# Patient Record
Sex: FEMALE | Race: BLACK OR AFRICAN AMERICAN | URBAN - METROPOLITAN AREA
[De-identification: names, ages, dates, MRNs, and addresses within clinical notes are randomized per-mention and may not be internally consistent; named-entity substitution may affect disease eponyms.]

---

## 2017-11-01 ENCOUNTER — EMERGENCY (EMERGENCY)
Facility: HOSPITAL | Age: 27
LOS: 1 days | Discharge: HOME | End: 2017-11-01
Attending: EMERGENCY MEDICINE
Payer: COMMERCIAL

## 2017-11-01 VITALS
SYSTOLIC BLOOD PRESSURE: 108 MMHG | OXYGEN SATURATION: 98 % | HEART RATE: 88 BPM | HEIGHT: 68 IN | WEIGHT: 169.98 LBS | TEMPERATURE: 98 F | RESPIRATION RATE: 18 BRPM | DIASTOLIC BLOOD PRESSURE: 64 MMHG

## 2017-11-01 DIAGNOSIS — Z98.89 OTHER SPECIFIED POSTPROCEDURAL STATES: Chronic | ICD-10-CM

## 2017-11-01 LAB
ALBUMIN SERPL ELPH-MCNC: 3.9 G/DL — SIGNIFICANT CHANGE UP (ref 3.5–5)
ALP SERPL-CCNC: 85 U/L — SIGNIFICANT CHANGE UP (ref 40–120)
ALT FLD-CCNC: 34 U/L DA — SIGNIFICANT CHANGE UP (ref 10–60)
ANION GAP SERPL CALC-SCNC: 11 MMOL/L — SIGNIFICANT CHANGE UP (ref 5–17)
APPEARANCE UR: CLEAR — SIGNIFICANT CHANGE UP
AST SERPL-CCNC: 32 U/L — SIGNIFICANT CHANGE UP (ref 10–40)
BASOPHILS # BLD AUTO: 0 K/UL — SIGNIFICANT CHANGE UP (ref 0–0.2)
BASOPHILS NFR BLD AUTO: 0.2 % — SIGNIFICANT CHANGE UP (ref 0–2)
BILIRUB SERPL-MCNC: 0.9 MG/DL — SIGNIFICANT CHANGE UP (ref 0.2–1.2)
BILIRUB UR-MCNC: NEGATIVE — SIGNIFICANT CHANGE UP
BUN SERPL-MCNC: 11 MG/DL — SIGNIFICANT CHANGE UP (ref 7–18)
CALCIUM SERPL-MCNC: 8.9 MG/DL — SIGNIFICANT CHANGE UP (ref 8.4–10.5)
CHLORIDE SERPL-SCNC: 105 MMOL/L — SIGNIFICANT CHANGE UP (ref 96–108)
CO2 SERPL-SCNC: 20 MMOL/L — LOW (ref 22–31)
COLOR SPEC: YELLOW — SIGNIFICANT CHANGE UP
CREAT SERPL-MCNC: 0.85 MG/DL — SIGNIFICANT CHANGE UP (ref 0.5–1.3)
DIFF PNL FLD: NEGATIVE — SIGNIFICANT CHANGE UP
EOSINOPHIL # BLD AUTO: 0 K/UL — SIGNIFICANT CHANGE UP (ref 0–0.5)
EOSINOPHIL NFR BLD AUTO: 0.2 % — SIGNIFICANT CHANGE UP (ref 0–6)
GLUCOSE SERPL-MCNC: 106 MG/DL — HIGH (ref 70–99)
GLUCOSE UR QL: NEGATIVE — SIGNIFICANT CHANGE UP
HCG SERPL-ACNC: <1 MIU/ML — SIGNIFICANT CHANGE UP
HCT VFR BLD CALC: 43.6 % — SIGNIFICANT CHANGE UP (ref 34.5–45)
HGB BLD-MCNC: 13.6 G/DL — SIGNIFICANT CHANGE UP (ref 11.5–15.5)
KETONES UR-MCNC: ABNORMAL
LEUKOCYTE ESTERASE UR-ACNC: ABNORMAL
LIDOCAIN IGE QN: 114 U/L — SIGNIFICANT CHANGE UP (ref 73–393)
LYMPHOCYTES # BLD AUTO: 0.4 K/UL — LOW (ref 1–3.3)
LYMPHOCYTES # BLD AUTO: 2.8 % — LOW (ref 13–44)
MCHC RBC-ENTMCNC: 30.5 PG — SIGNIFICANT CHANGE UP (ref 27–34)
MCHC RBC-ENTMCNC: 31.2 GM/DL — LOW (ref 32–36)
MCV RBC AUTO: 97.7 FL — SIGNIFICANT CHANGE UP (ref 80–100)
MONOCYTES # BLD AUTO: 0.5 K/UL — SIGNIFICANT CHANGE UP (ref 0–0.9)
MONOCYTES NFR BLD AUTO: 4 % — SIGNIFICANT CHANGE UP (ref 2–14)
NEUTROPHILS # BLD AUTO: 11.6 K/UL — HIGH (ref 1.8–7.4)
NEUTROPHILS NFR BLD AUTO: 92.8 % — HIGH (ref 43–77)
NITRITE UR-MCNC: NEGATIVE — SIGNIFICANT CHANGE UP
PH UR: 7 — SIGNIFICANT CHANGE UP (ref 5–8)
PLATELET # BLD AUTO: 136 K/UL — LOW (ref 150–400)
POTASSIUM SERPL-MCNC: 3.8 MMOL/L — SIGNIFICANT CHANGE UP (ref 3.5–5.3)
POTASSIUM SERPL-SCNC: 3.8 MMOL/L — SIGNIFICANT CHANGE UP (ref 3.5–5.3)
PROT SERPL-MCNC: 8.7 G/DL — HIGH (ref 6–8.3)
PROT UR-MCNC: NEGATIVE — SIGNIFICANT CHANGE UP
RBC # BLD: 4.46 M/UL — SIGNIFICANT CHANGE UP (ref 3.8–5.2)
RBC # FLD: 13.5 % — SIGNIFICANT CHANGE UP (ref 10.3–14.5)
SODIUM SERPL-SCNC: 136 MMOL/L — SIGNIFICANT CHANGE UP (ref 135–145)
SP GR SPEC: 1.01 — SIGNIFICANT CHANGE UP (ref 1.01–1.02)
UROBILINOGEN FLD QL: NEGATIVE — SIGNIFICANT CHANGE UP
WBC # BLD: 12.6 K/UL — HIGH (ref 3.8–10.5)
WBC # FLD AUTO: 12.6 K/UL — HIGH (ref 3.8–10.5)

## 2017-11-01 PROCEDURE — 85027 COMPLETE CBC AUTOMATED: CPT

## 2017-11-01 PROCEDURE — 99284 EMERGENCY DEPT VISIT MOD MDM: CPT | Mod: 25

## 2017-11-01 PROCEDURE — 81001 URINALYSIS AUTO W/SCOPE: CPT

## 2017-11-01 PROCEDURE — 96375 TX/PRO/DX INJ NEW DRUG ADDON: CPT

## 2017-11-01 PROCEDURE — 99285 EMERGENCY DEPT VISIT HI MDM: CPT

## 2017-11-01 PROCEDURE — 96376 TX/PRO/DX INJ SAME DRUG ADON: CPT

## 2017-11-01 PROCEDURE — 80053 COMPREHEN METABOLIC PANEL: CPT

## 2017-11-01 PROCEDURE — 84702 CHORIONIC GONADOTROPIN TEST: CPT

## 2017-11-01 PROCEDURE — 96374 THER/PROPH/DIAG INJ IV PUSH: CPT

## 2017-11-01 PROCEDURE — 83690 ASSAY OF LIPASE: CPT

## 2017-11-01 PROCEDURE — 82962 GLUCOSE BLOOD TEST: CPT

## 2017-11-01 RX ORDER — SODIUM CHLORIDE 9 MG/ML
1000 INJECTION INTRAMUSCULAR; INTRAVENOUS; SUBCUTANEOUS ONCE
Qty: 0 | Refills: 0 | Status: COMPLETED | OUTPATIENT
Start: 2017-11-01 | End: 2017-11-01

## 2017-11-01 RX ORDER — METOCLOPRAMIDE HCL 10 MG
10 TABLET ORAL ONCE
Qty: 0 | Refills: 0 | Status: COMPLETED | OUTPATIENT
Start: 2017-11-01 | End: 2017-11-01

## 2017-11-01 RX ORDER — ONDANSETRON 8 MG/1
4 TABLET, FILM COATED ORAL ONCE
Qty: 0 | Refills: 0 | Status: COMPLETED | OUTPATIENT
Start: 2017-11-01 | End: 2017-11-01

## 2017-11-01 RX ORDER — FAMOTIDINE 10 MG/ML
20 INJECTION INTRAVENOUS ONCE
Qty: 0 | Refills: 0 | Status: COMPLETED | OUTPATIENT
Start: 2017-11-01 | End: 2017-11-01

## 2017-11-01 RX ORDER — MORPHINE SULFATE 50 MG/1
4 CAPSULE, EXTENDED RELEASE ORAL ONCE
Qty: 0 | Refills: 0 | Status: DISCONTINUED | OUTPATIENT
Start: 2017-11-01 | End: 2017-11-01

## 2017-11-01 RX ORDER — ACETAMINOPHEN 500 MG
975 TABLET ORAL ONCE
Qty: 0 | Refills: 0 | Status: COMPLETED | OUTPATIENT
Start: 2017-11-01 | End: 2017-11-01

## 2017-11-01 RX ADMIN — Medication 30 MILLILITER(S): at 15:15

## 2017-11-01 RX ADMIN — Medication 10 MILLIGRAM(S): at 23:52

## 2017-11-01 RX ADMIN — Medication 975 MILLIGRAM(S): at 21:15

## 2017-11-01 RX ADMIN — FAMOTIDINE 20 MILLIGRAM(S): 10 INJECTION INTRAVENOUS at 15:15

## 2017-11-01 RX ADMIN — ONDANSETRON 4 MILLIGRAM(S): 8 TABLET, FILM COATED ORAL at 22:30

## 2017-11-01 RX ADMIN — ONDANSETRON 4 MILLIGRAM(S): 8 TABLET, FILM COATED ORAL at 15:16

## 2017-11-01 RX ADMIN — SODIUM CHLORIDE 1000 MILLILITER(S): 9 INJECTION INTRAMUSCULAR; INTRAVENOUS; SUBCUTANEOUS at 14:33

## 2017-11-01 RX ADMIN — SODIUM CHLORIDE 1000 MILLILITER(S): 9 INJECTION INTRAMUSCULAR; INTRAVENOUS; SUBCUTANEOUS at 19:28

## 2017-11-01 RX ADMIN — SODIUM CHLORIDE 1000 MILLILITER(S): 9 INJECTION INTRAMUSCULAR; INTRAVENOUS; SUBCUTANEOUS at 22:30

## 2017-11-01 NOTE — ED PROVIDER NOTE - MEDICAL DECISION MAKING DETAILS
28 y/o F presenting w/ multiple episodes of vomiting and abd pain. Will give GI cocktail, IVF, check labs and re-assess. 28 y/o F presenting w/ multiple episodes of vomiting and abd pain. Will give GI cocktail, IVF, check labs and re-assess.  patient feeling better. Tolerated PO, abdomen nontender. home with symptomatic care.

## 2017-11-01 NOTE — ED PROVIDER NOTE - OBJECTIVE STATEMENT
28 y/o F w/ PMHx of GI bleed s/p laparotomy presents to ED c/o many episodes of yellow vomiting and upper abd pain. Pt also notes having weakness. Denies any fever, diarrhea, or any other complaints. NKDA.

## 2017-11-01 NOTE — ED ADULT NURSE NOTE - OBJECTIVE STATEMENT
pt a&ox3, here with c/o vomiting. pt is employee of hospital. pt with n/v which started today. denies abdominal pain.

## 2017-11-01 NOTE — ED ADULT NURSE NOTE - ED STAT RN HANDOFF DETAILS
endorsed to RN Emilio in B2 in stable condition for continuation of care. pt a&ox3, here for vomiting. 18G left hand. due for CT scan. endorsed to RN VALENTINE in B2 in stable condition for continuation of care. pt a&ox3, here for vomiting. 18G left hand. pending MD dispo.

## 2017-11-01 NOTE — ED PROVIDER NOTE - PROGRESS NOTE DETAILS
patient now started having diarrhea, multiple episodes while in Emergency Department. abdomen nontender on multiple reassessments patient vomited again. will give IV fluids zofran and reassess

## 2017-11-02 VITALS
RESPIRATION RATE: 16 BRPM | HEART RATE: 95 BPM | OXYGEN SATURATION: 100 % | DIASTOLIC BLOOD PRESSURE: 57 MMHG | SYSTOLIC BLOOD PRESSURE: 108 MMHG | TEMPERATURE: 98 F

## 2017-12-27 NOTE — ED ADULT NURSE NOTE - CHPI ED SYMPTOMS POS
Ashley from Crisis states she is going to place patient on a chapter 51. MICHELLE called.    VOMITING/NAUSEA

## 2018-05-23 ENCOUNTER — EMERGENCY (EMERGENCY)
Facility: HOSPITAL | Age: 28
LOS: 1 days | Discharge: HOME | End: 2018-05-23
Attending: EMERGENCY MEDICINE
Payer: COMMERCIAL

## 2018-05-23 VITALS
OXYGEN SATURATION: 100 % | SYSTOLIC BLOOD PRESSURE: 110 MMHG | HEIGHT: 68 IN | DIASTOLIC BLOOD PRESSURE: 76 MMHG | RESPIRATION RATE: 16 BRPM | WEIGHT: 169.98 LBS | TEMPERATURE: 98 F | HEART RATE: 80 BPM

## 2018-05-23 DIAGNOSIS — Z98.89 OTHER SPECIFIED POSTPROCEDURAL STATES: Chronic | ICD-10-CM

## 2018-05-23 PROCEDURE — 99282 EMERGENCY DEPT VISIT SF MDM: CPT

## 2018-05-23 PROCEDURE — 99283 EMERGENCY DEPT VISIT LOW MDM: CPT

## 2018-05-23 RX ORDER — OFLOXACIN 0.3 %
1 DROPS OPHTHALMIC (EYE)
Qty: 1 | Refills: 0 | OUTPATIENT
Start: 2018-05-23 | End: 2018-05-27

## 2018-05-23 NOTE — ED PROVIDER NOTE - OBJECTIVE STATEMENT
pink eye Patient reports redness to right eye for 1 day with discharge and crusting of eyelashes. No fever, vision loss, ha, nasal congestion.

## 2018-05-23 NOTE — ED PROVIDER NOTE - MEDICAL DECISION MAKING DETAILS
slit lamp, antibiotic eye drops and work note   instructions on care and contangious slit lamp, antibiotic eye drops and work note   instructions on care and contagion. Return to the ED immediately if getting worse, not improving, or if having any new or troubling symptoms.

## 2018-09-25 ENCOUNTER — EMERGENCY (EMERGENCY)
Facility: HOSPITAL | Age: 28
LOS: 1 days | Discharge: HOME | End: 2018-09-25
Attending: EMERGENCY MEDICINE
Payer: COMMERCIAL

## 2018-09-25 VITALS
HEART RATE: 73 BPM | RESPIRATION RATE: 16 BRPM | OXYGEN SATURATION: 100 % | SYSTOLIC BLOOD PRESSURE: 117 MMHG | TEMPERATURE: 98 F | DIASTOLIC BLOOD PRESSURE: 69 MMHG

## 2018-09-25 DIAGNOSIS — Z98.89 OTHER SPECIFIED POSTPROCEDURAL STATES: Chronic | ICD-10-CM

## 2018-09-25 PROCEDURE — 73620 X-RAY EXAM OF FOOT: CPT | Mod: 26,LT

## 2018-09-25 PROCEDURE — 99283 EMERGENCY DEPT VISIT LOW MDM: CPT | Mod: 25

## 2018-09-25 PROCEDURE — 99283 EMERGENCY DEPT VISIT LOW MDM: CPT

## 2018-09-25 PROCEDURE — 73620 X-RAY EXAM OF FOOT: CPT

## 2018-09-25 NOTE — ED PROVIDER NOTE - OBJECTIVE STATEMENT
29 y/o F patient with no significant PMHx and a significant PSHx of vascular surgery presents to the ED with left foot pain. Patient states her foot was caught between two x2 objects. Patient states she began to feel left foot pain shortly after. Patient denies any other complaints. NKDA.

## 2018-09-25 NOTE — ED ADULT NURSE NOTE - NSIMPLEMENTINTERV_GEN_ALL_ED
Implemented All Universal Safety Interventions:  [place] to call system. Call bell, personal items and telephone within reach. Instruct patient to call for assistance. Room bathroom lighting operational. Non-slip footwear when patient is off stretcher. Physically safe environment: no spills, clutter or unnecessary equipment. Stretcher in lowest position, wheels locked, appropriate side rails in place.

## 2018-12-04 ENCOUNTER — INPATIENT (INPATIENT)
Facility: HOSPITAL | Age: 28
LOS: 1 days | Discharge: HOME | DRG: 745 | End: 2018-12-06
Attending: OBSTETRICS & GYNECOLOGY | Admitting: OBSTETRICS & GYNECOLOGY
Payer: COMMERCIAL

## 2018-12-04 ENCOUNTER — TRANSCRIPTION ENCOUNTER (OUTPATIENT)
Age: 28
End: 2018-12-04

## 2018-12-04 VITALS
TEMPERATURE: 98 F | RESPIRATION RATE: 18 BRPM | WEIGHT: 177.91 LBS | HEART RATE: 68 BPM | HEIGHT: 68 IN | DIASTOLIC BLOOD PRESSURE: 70 MMHG | OXYGEN SATURATION: 100 % | SYSTOLIC BLOOD PRESSURE: 118 MMHG

## 2018-12-04 DIAGNOSIS — D25.9 LEIOMYOMA OF UTERUS, UNSPECIFIED: ICD-10-CM

## 2018-12-04 DIAGNOSIS — Z98.89 OTHER SPECIFIED POSTPROCEDURAL STATES: Chronic | ICD-10-CM

## 2018-12-04 DIAGNOSIS — N93.9 ABNORMAL UTERINE AND VAGINAL BLEEDING, UNSPECIFIED: ICD-10-CM

## 2018-12-04 LAB
ALBUMIN SERPL ELPH-MCNC: 3.7 G/DL — SIGNIFICANT CHANGE UP (ref 3.5–5)
ALP SERPL-CCNC: 68 U/L — SIGNIFICANT CHANGE UP (ref 40–120)
ALT FLD-CCNC: 31 U/L DA — SIGNIFICANT CHANGE UP (ref 10–60)
ANION GAP SERPL CALC-SCNC: 9 MMOL/L — SIGNIFICANT CHANGE UP (ref 5–17)
APTT BLD: 29.7 SEC — SIGNIFICANT CHANGE UP (ref 27.5–36.3)
AST SERPL-CCNC: 20 U/L — SIGNIFICANT CHANGE UP (ref 10–40)
BASOPHILS # BLD AUTO: 0.1 K/UL — SIGNIFICANT CHANGE UP (ref 0–0.2)
BASOPHILS NFR BLD AUTO: 1.1 % — SIGNIFICANT CHANGE UP (ref 0–2)
BILIRUB SERPL-MCNC: 0.3 MG/DL — SIGNIFICANT CHANGE UP (ref 0.2–1.2)
BUN SERPL-MCNC: 12 MG/DL — SIGNIFICANT CHANGE UP (ref 7–18)
CALCIUM SERPL-MCNC: 8.4 MG/DL — SIGNIFICANT CHANGE UP (ref 8.4–10.5)
CHLORIDE SERPL-SCNC: 107 MMOL/L — SIGNIFICANT CHANGE UP (ref 96–108)
CO2 SERPL-SCNC: 24 MMOL/L — SIGNIFICANT CHANGE UP (ref 22–31)
CREAT SERPL-MCNC: 0.82 MG/DL — SIGNIFICANT CHANGE UP (ref 0.5–1.3)
EOSINOPHIL # BLD AUTO: 0.2 K/UL — SIGNIFICANT CHANGE UP (ref 0–0.5)
EOSINOPHIL NFR BLD AUTO: 4 % — SIGNIFICANT CHANGE UP (ref 0–6)
GLUCOSE SERPL-MCNC: 90 MG/DL — SIGNIFICANT CHANGE UP (ref 70–99)
HCG SERPL-ACNC: <1 MIU/ML — SIGNIFICANT CHANGE UP
HCT VFR BLD CALC: 35.9 % — SIGNIFICANT CHANGE UP (ref 34.5–45)
HCT VFR BLD CALC: 38.2 % — SIGNIFICANT CHANGE UP (ref 34.5–45)
HCT VFR BLD CALC: 40 % — SIGNIFICANT CHANGE UP (ref 34.5–45)
HGB BLD-MCNC: 11.1 G/DL — LOW (ref 11.5–15.5)
HGB BLD-MCNC: 12 G/DL — SIGNIFICANT CHANGE UP (ref 11.5–15.5)
HGB BLD-MCNC: 12.7 G/DL — SIGNIFICANT CHANGE UP (ref 11.5–15.5)
INR BLD: 1.06 RATIO — SIGNIFICANT CHANGE UP (ref 0.88–1.16)
LYMPHOCYTES # BLD AUTO: 2.3 K/UL — SIGNIFICANT CHANGE UP (ref 1–3.3)
LYMPHOCYTES # BLD AUTO: 43.7 % — SIGNIFICANT CHANGE UP (ref 13–44)
MCHC RBC-ENTMCNC: 29.9 PG — SIGNIFICANT CHANGE UP (ref 27–34)
MCHC RBC-ENTMCNC: 30 PG — SIGNIFICANT CHANGE UP (ref 27–34)
MCHC RBC-ENTMCNC: 30.4 PG — SIGNIFICANT CHANGE UP (ref 27–34)
MCHC RBC-ENTMCNC: 31 GM/DL — LOW (ref 32–36)
MCHC RBC-ENTMCNC: 31.4 GM/DL — LOW (ref 32–36)
MCHC RBC-ENTMCNC: 31.9 GM/DL — LOW (ref 32–36)
MCV RBC AUTO: 94 FL — SIGNIFICANT CHANGE UP (ref 80–100)
MCV RBC AUTO: 95.6 FL — SIGNIFICANT CHANGE UP (ref 80–100)
MCV RBC AUTO: 97.9 FL — SIGNIFICANT CHANGE UP (ref 80–100)
MONOCYTES # BLD AUTO: 0.4 K/UL — SIGNIFICANT CHANGE UP (ref 0–0.9)
MONOCYTES NFR BLD AUTO: 7.9 % — SIGNIFICANT CHANGE UP (ref 2–14)
NEUTROPHILS # BLD AUTO: 2.3 K/UL — SIGNIFICANT CHANGE UP (ref 1.8–7.4)
NEUTROPHILS NFR BLD AUTO: 43.3 % — SIGNIFICANT CHANGE UP (ref 43–77)
PLATELET # BLD AUTO: 176 K/UL — SIGNIFICANT CHANGE UP (ref 150–400)
PLATELET # BLD AUTO: 195 K/UL — SIGNIFICANT CHANGE UP (ref 150–400)
PLATELET # BLD AUTO: 199 K/UL — SIGNIFICANT CHANGE UP (ref 150–400)
POTASSIUM SERPL-MCNC: 3.8 MMOL/L — SIGNIFICANT CHANGE UP (ref 3.5–5.3)
POTASSIUM SERPL-SCNC: 3.8 MMOL/L — SIGNIFICANT CHANGE UP (ref 3.5–5.3)
PROT SERPL-MCNC: 7.9 G/DL — SIGNIFICANT CHANGE UP (ref 6–8.3)
PROTHROM AB SERPL-ACNC: 11.8 SEC — SIGNIFICANT CHANGE UP (ref 10–12.9)
RBC # BLD: 3.67 M/UL — LOW (ref 3.8–5.2)
RBC # BLD: 4 M/UL — SIGNIFICANT CHANGE UP (ref 3.8–5.2)
RBC # BLD: 4.25 M/UL — SIGNIFICANT CHANGE UP (ref 3.8–5.2)
RBC # FLD: 12.8 % — SIGNIFICANT CHANGE UP (ref 10.3–14.5)
RBC # FLD: 13.1 % — SIGNIFICANT CHANGE UP (ref 10.3–14.5)
RBC # FLD: 13.1 % — SIGNIFICANT CHANGE UP (ref 10.3–14.5)
SODIUM SERPL-SCNC: 140 MMOL/L — SIGNIFICANT CHANGE UP (ref 135–145)
WBC # BLD: 5.2 K/UL — SIGNIFICANT CHANGE UP (ref 3.8–10.5)
WBC # BLD: 5.8 K/UL — SIGNIFICANT CHANGE UP (ref 3.8–10.5)
WBC # BLD: 6.8 K/UL — SIGNIFICANT CHANGE UP (ref 3.8–10.5)
WBC # FLD AUTO: 5.2 K/UL — SIGNIFICANT CHANGE UP (ref 3.8–10.5)
WBC # FLD AUTO: 5.8 K/UL — SIGNIFICANT CHANGE UP (ref 3.8–10.5)
WBC # FLD AUTO: 6.8 K/UL — SIGNIFICANT CHANGE UP (ref 3.8–10.5)

## 2018-12-04 PROCEDURE — 99285 EMERGENCY DEPT VISIT HI MDM: CPT

## 2018-12-04 PROCEDURE — 76830 TRANSVAGINAL US NON-OB: CPT | Mod: 26

## 2018-12-04 PROCEDURE — 76856 US EXAM PELVIC COMPLETE: CPT | Mod: 26

## 2018-12-04 RX ORDER — SODIUM CHLORIDE 9 MG/ML
1000 INJECTION INTRAMUSCULAR; INTRAVENOUS; SUBCUTANEOUS ONCE
Qty: 0 | Refills: 0 | Status: COMPLETED | OUTPATIENT
Start: 2018-12-04 | End: 2018-12-04

## 2018-12-04 RX ADMIN — SODIUM CHLORIDE 1000 MILLILITER(S): 9 INJECTION INTRAMUSCULAR; INTRAVENOUS; SUBCUTANEOUS at 22:55

## 2018-12-04 NOTE — H&P ADULT - ASSESSMENT
a/p 29 y/o LMP 12/3 h/o uterine fibroid with menorrhagia and syncope, stable  admit   serial cbc, repeat vitals, pad checks  possible blood transfusion  IV hydration  npo  dw Dr. Prince [place] attending

## 2018-12-04 NOTE — ED ADULT NURSE REASSESSMENT NOTE - NS ED NURSE REASSESS COMMENT FT1
At around 2200, pt went to the restroom and said she wasn't feeling good, she synopsized and Colton Lackey NP caught pt, she synopsized for 5 second. She was lowered to the floor and transferred to a stretcher w assistance.   and EKG done, pt transferred to the main and will be monitored.

## 2018-12-04 NOTE — ED PROVIDER NOTE - ATTENDING CONTRIBUTION TO CARE
I conducted a face-to-face interaction with patient and I agree with NP documentation and plan.  Pt presents w/vaginal bleeding  Exam:  abdomen: soft, nontender  A/P: vag bleeding with syncope, admit to gynecology service for observation/further mgmt.

## 2018-12-04 NOTE — ED ADULT NURSE NOTE - NSIMPLEMENTINTERV_GEN_ALL_ED
Implemented All Universal Safety Interventions:  [place] to call system. Call bell, personal items and telephone within reach. Instruct patient to call for assistance. Room bathroom lighting operational. Non-slip footwear when patient is off stretcher. Physically safe environment: no spills, clutter or unnecessary equipment. Stretcher in lowest position, wheels locked, appropriate side rails in place.

## 2018-12-04 NOTE — CHART NOTE - NSCHARTNOTEFT_GEN_A_CORE
GYN PA Focused Note    Patient reevaluated at bedside,  reconsulted  patient had witnessed syncopal episode  patient was discharge from ER, went to the restroom before leaving, states she felt lightheaded, opened the bathroom door and called for the ER provider who witnesses LOC, caught the room, patient did not hit the floor, did not hit her head.   Patient states the bleeding is heavy with clots.  plan to do serial cbc, repeat vitals, pad checks, possible blood transfusion  dw Dr. Prince [place] attending

## 2018-12-04 NOTE — ED PROVIDER NOTE - OBJECTIVE STATEMENT
27 y/o F with history of intraabdominal bleeding (? ruptured ovarian cyst) presents to the ED with sudden onset of heavy vaginal bleeding and passing large clots 30 minutes prior to arrival. Patient states yesterday had vaginal spotting and abdominal cramping per her usual menses. Patient denies fever, chills, nausea, vomiting or any other complaints. NKDA.

## 2018-12-04 NOTE — CONSULT NOTE ADULT - ASSESSMENT
a/p 27 y/o LMP 12/3 with uterine fibroid with heavy menses, stable  dc home  patient was educated, needs GYN f/u referred to Dr. Prince' office on Thursday  start iron 325mg tid, colace 100mg prn for stool soften  return to ER if symptoms of anemia occur which were understood by the patient  dw Dr. Prince [place] attending

## 2018-12-04 NOTE — H&P ADULT - PROBLEM SELECTOR PLAN 1
a/p 27 y/o LMP 12/3 h/o uterine fibroid with menorrhagia and syncope, stable  admit   serial cbc, repeat vitals, pad checks  possible blood transfusion  IV hydration  npo  dw Dr. Prince [place] attending

## 2018-12-04 NOTE — H&P ADULT - HISTORY OF PRESENT ILLNESS
27 y/o  LMP 12/3 h/o uterine fibroid presented with c/o heavy vaginal bleeding with large clots. Patient states last heavy period she had was in August, she is aware of her fibroid however does not have GYN at this time, her insurance changed. Denies syncope, dizziness, CP, palpitations.  pobx top  w/d&c  pgynx uterine fibroid  pmhx denies  psx laparoscopic converted to ex laparotomy for intraabdominal hemorrhage 2L evacuated is unsure of what happened, vertical scar  meds none  allergies denies

## 2018-12-04 NOTE — ED PROVIDER NOTE - MEDICAL DECISION MAKING DETAILS
27 y/o F presents with sudden onset of vaginal bleeding. Will obtain labs, ultrasound, GYN consult and reassess.

## 2018-12-04 NOTE — CONSULT NOTE ADULT - SUBJECTIVE AND OBJECTIVE BOX
29 y/o  LMP 12/3 h/o uterine fibroid presented with c/o heavy vaginal bleeding with large clots. Patient states last heavy period she had was in August, she is aware of her fibroid however does not have GYN at this time, her insurance changed. Denies syncope, dizziness, CP, palpitations.  pobx top  w/d&c  pgynx uterine fibroid  pmhx denies  psx laparoscopic converted to ex laparotomy for intraabdominal hemorrhage 2L evacuated is unsure of what happened, vertical scar  meds none  allergies denies    Vital Signs Last 24 Hrs  T(C): 36.8 (04 Dec 2018 20:21), Max: 36.9 (04 Dec 2018 17:53)  T(F): 98.2 (04 Dec 2018 20:21), Max: 98.4 (04 Dec 2018 17:53)  HR: 74 (04 Dec 2018 21:48) (68 - 74)  BP: 91/53 (04 Dec 2018 21:48) (85/88 - 118/70)  BP(mean): --  RR: 15 (04 Dec 2018 21:48) (15 - 18)  SpO2: 100% (04 Dec 2018 21:48) (68% - 100%)    gen aox3, not in acute distresss  abd soft, non tender, non distended  speculum os appears closed, large clots evacuated from vaginal vault    h/h @1800 12.7/40  h/h @2100 12     < from: US Transvaginal (18 @ 18:47) >  IMPRESSION: The uterus is enlarged and lobulated with space-occupying   lesions identified most consistent with uterine myomas measuring up to   7.4 cm. Endometrial thickness approximates 7 mm. Unremarkable sonographic   appearance of the bilateral ovarian parenchyma.    < end of copied text >

## 2018-12-04 NOTE — H&P ADULT - NSHPPHYSICALEXAM_GEN_ALL_CORE
Vital Signs Last 24 Hrs  T(C): 36.8 (04 Dec 2018 20:21), Max: 36.9 (04 Dec 2018 17:53)  T(F): 98.2 (04 Dec 2018 20:21), Max: 98.4 (04 Dec 2018 17:53)  HR: 74 (04 Dec 2018 21:48) (68 - 74)  BP: 91/53 (04 Dec 2018 21:48) (85/88 - 118/70)  BP(mean): --  RR: 15 (04 Dec 2018 21:48) (15 - 18)  SpO2: 100% (04 Dec 2018 21:48) (68% - 100%)    gen aox3, not in acute distresss  abd soft, non tender, non distended  speculum os appears closed, large clots evacuated from vaginal vault    h/h @1800 12.7/40  h/h @2100 12/38

## 2018-12-04 NOTE — H&P ADULT - NSHPLABSRESULTS_GEN_ALL_CORE
< from: US Transvaginal (12.04.18 @ 18:47) >  IMPRESSION: The uterus is enlarged and lobulated with space-occupying   lesions identified most consistent with uterine myomas measuring up to   7.4 cm. Endometrial thickness approximates 7 mm. Unremarkable sonographic   appearance of the bilateral ovarian parenchyma.    < end of copied text >

## 2018-12-04 NOTE — CONSULT NOTE ADULT - PROBLEM SELECTOR RECOMMENDATION 9
a/p 27 y/o LMP 12/3 with uterine fibroid with heavy menses, stable  dc home  patient was educated, needs GYN f/u referred to Dr. Prince' office on Thursday  start iron 325mg tid, colace 100mg prn for stool soften  return to ER if symptoms of anemia occur which were understood by the patient  dw Dr. Prince [place] attending

## 2018-12-04 NOTE — ED PROVIDER NOTE - PROGRESS NOTE DETAILS
H/H stable. Bleeding from uterine fibroid. Was seen by GYN team will dc with instructions to follow up with GYN within 5 days and take Iron pills. Pt is well appearing walking with steady gait, stable for discharge and follow up without fail with medical doctor. I had a detailed discussion with the patient and/or guardian regarding the historical points, exam findings, and any diagnostic results supporting the discharge diagnosis. Pt educated on care and need for follow up. Strict return instructions and red flag signs and symptoms discussed with patient. Questions answered. Pt shows understanding of discharge information and agrees to follow. Patient had syncope episode, was caught by me before fall on the ground. Fingerstick 133. Will repeat stat CBC. Discussed with DANIELA Chavez. Will admit for observation.

## 2018-12-05 ENCOUNTER — TRANSCRIPTION ENCOUNTER (OUTPATIENT)
Age: 28
End: 2018-12-05

## 2018-12-05 ENCOUNTER — RESULT REVIEW (OUTPATIENT)
Age: 28
End: 2018-12-05

## 2018-12-05 DIAGNOSIS — R55 SYNCOPE AND COLLAPSE: ICD-10-CM

## 2018-12-05 DIAGNOSIS — N93.9 ABNORMAL UTERINE AND VAGINAL BLEEDING, UNSPECIFIED: ICD-10-CM

## 2018-12-05 DIAGNOSIS — N92.0 EXCESSIVE AND FREQUENT MENSTRUATION WITH REGULAR CYCLE: ICD-10-CM

## 2018-12-05 LAB
ABO RH CONFIRMATION: SIGNIFICANT CHANGE UP
BASOPHILS # BLD AUTO: 0 K/UL — SIGNIFICANT CHANGE UP (ref 0–0.2)
BASOPHILS # BLD AUTO: 0.1 K/UL — SIGNIFICANT CHANGE UP (ref 0–0.2)
BASOPHILS NFR BLD AUTO: 0.5 % — SIGNIFICANT CHANGE UP (ref 0–2)
BASOPHILS NFR BLD AUTO: 1.1 % — SIGNIFICANT CHANGE UP (ref 0–2)
EOSINOPHIL # BLD AUTO: 0.1 K/UL — SIGNIFICANT CHANGE UP (ref 0–0.5)
EOSINOPHIL # BLD AUTO: 0.1 K/UL — SIGNIFICANT CHANGE UP (ref 0–0.5)
EOSINOPHIL NFR BLD AUTO: 1.5 % — SIGNIFICANT CHANGE UP (ref 0–6)
EOSINOPHIL NFR BLD AUTO: 2.2 % — SIGNIFICANT CHANGE UP (ref 0–6)
HCT VFR BLD CALC: 29.1 % — LOW (ref 34.5–45)
HCT VFR BLD CALC: 31.6 % — LOW (ref 34.5–45)
HCT VFR BLD CALC: 33.4 % — LOW (ref 34.5–45)
HGB BLD-MCNC: 10.4 G/DL — LOW (ref 11.5–15.5)
HGB BLD-MCNC: 8.9 G/DL — LOW (ref 11.5–15.5)
HGB BLD-MCNC: 9.8 G/DL — LOW (ref 11.5–15.5)
LYMPHOCYTES # BLD AUTO: 1.5 K/UL — SIGNIFICANT CHANGE UP (ref 1–3.3)
LYMPHOCYTES # BLD AUTO: 2.3 K/UL — SIGNIFICANT CHANGE UP (ref 1–3.3)
LYMPHOCYTES # BLD AUTO: 26.5 % — SIGNIFICANT CHANGE UP (ref 13–44)
LYMPHOCYTES # BLD AUTO: 37.1 % — SIGNIFICANT CHANGE UP (ref 13–44)
MCHC RBC-ENTMCNC: 27.7 PG — SIGNIFICANT CHANGE UP (ref 27–34)
MCHC RBC-ENTMCNC: 28.3 PG — SIGNIFICANT CHANGE UP (ref 27–34)
MCHC RBC-ENTMCNC: 30.1 PG — SIGNIFICANT CHANGE UP (ref 27–34)
MCHC RBC-ENTMCNC: 30.6 GM/DL — LOW (ref 32–36)
MCHC RBC-ENTMCNC: 31 GM/DL — LOW (ref 32–36)
MCHC RBC-ENTMCNC: 31 GM/DL — LOW (ref 32–36)
MCV RBC AUTO: 89.4 FL — SIGNIFICANT CHANGE UP (ref 80–100)
MCV RBC AUTO: 92.5 FL — SIGNIFICANT CHANGE UP (ref 80–100)
MCV RBC AUTO: 97 FL — SIGNIFICANT CHANGE UP (ref 80–100)
MONOCYTES # BLD AUTO: 0.3 K/UL — SIGNIFICANT CHANGE UP (ref 0–0.9)
MONOCYTES # BLD AUTO: 0.4 K/UL — SIGNIFICANT CHANGE UP (ref 0–0.9)
MONOCYTES NFR BLD AUTO: 5.4 % — SIGNIFICANT CHANGE UP (ref 2–14)
MONOCYTES NFR BLD AUTO: 6.7 % — SIGNIFICANT CHANGE UP (ref 2–14)
NEUTROPHILS # BLD AUTO: 3.4 K/UL — SIGNIFICANT CHANGE UP (ref 1.8–7.4)
NEUTROPHILS # BLD AUTO: 3.8 K/UL — SIGNIFICANT CHANGE UP (ref 1.8–7.4)
NEUTROPHILS NFR BLD AUTO: 53 % — SIGNIFICANT CHANGE UP (ref 43–77)
NEUTROPHILS NFR BLD AUTO: 66.1 % — SIGNIFICANT CHANGE UP (ref 43–77)
PLATELET # BLD AUTO: 142 K/UL — LOW (ref 150–400)
PLATELET # BLD AUTO: 152 K/UL — SIGNIFICANT CHANGE UP (ref 150–400)
PLATELET # BLD AUTO: 153 K/UL — SIGNIFICANT CHANGE UP (ref 150–400)
RBC # BLD: 3.15 M/UL — LOW (ref 3.8–5.2)
RBC # BLD: 3.26 M/UL — LOW (ref 3.8–5.2)
RBC # BLD: 3.74 M/UL — LOW (ref 3.8–5.2)
RBC # FLD: 12.8 % — SIGNIFICANT CHANGE UP (ref 10.3–14.5)
RBC # FLD: 19.3 % — HIGH (ref 10.3–14.5)
RBC # FLD: 19.4 % — HIGH (ref 10.3–14.5)
WBC # BLD: 5.8 K/UL — SIGNIFICANT CHANGE UP (ref 3.8–10.5)
WBC # BLD: 6.3 K/UL — SIGNIFICANT CHANGE UP (ref 3.8–10.5)
WBC # BLD: 7.2 K/UL — SIGNIFICANT CHANGE UP (ref 3.8–10.5)
WBC # FLD AUTO: 5.8 K/UL — SIGNIFICANT CHANGE UP (ref 3.8–10.5)
WBC # FLD AUTO: 6.3 K/UL — SIGNIFICANT CHANGE UP (ref 3.8–10.5)
WBC # FLD AUTO: 7.2 K/UL — SIGNIFICANT CHANGE UP (ref 3.8–10.5)

## 2018-12-05 PROCEDURE — 88305 TISSUE EXAM BY PATHOLOGIST: CPT | Mod: 26

## 2018-12-05 RX ORDER — SODIUM CHLORIDE 9 MG/ML
1000 INJECTION, SOLUTION INTRAVENOUS
Qty: 0 | Refills: 0 | Status: DISCONTINUED | OUTPATIENT
Start: 2018-12-05 | End: 2018-12-06

## 2018-12-05 RX ORDER — HYDROMORPHONE HYDROCHLORIDE 2 MG/ML
0.5 INJECTION INTRAMUSCULAR; INTRAVENOUS; SUBCUTANEOUS
Qty: 0 | Refills: 0 | Status: DISCONTINUED | OUTPATIENT
Start: 2018-12-05 | End: 2018-12-05

## 2018-12-05 RX ORDER — FERROUS SULFATE 325(65) MG
1 TABLET ORAL
Qty: 90 | Refills: 0 | OUTPATIENT
Start: 2018-12-05 | End: 2019-01-03

## 2018-12-05 RX ORDER — SODIUM CHLORIDE 9 MG/ML
1000 INJECTION, SOLUTION INTRAVENOUS
Qty: 0 | Refills: 0 | Status: DISCONTINUED | OUTPATIENT
Start: 2018-12-05 | End: 2018-12-05

## 2018-12-05 RX ORDER — MEDROXYPROGESTERONE ACETATE 150 MG/ML
1 INJECTION, SUSPENSION, EXTENDED RELEASE INTRAMUSCULAR
Qty: 10 | Refills: 0 | OUTPATIENT
Start: 2018-12-05 | End: 2018-12-14

## 2018-12-05 RX ORDER — ONDANSETRON 8 MG/1
4 TABLET, FILM COATED ORAL ONCE
Qty: 0 | Refills: 0 | Status: DISCONTINUED | OUTPATIENT
Start: 2018-12-05 | End: 2018-12-05

## 2018-12-05 RX ORDER — IBUPROFEN 200 MG
600 TABLET ORAL EVERY 6 HOURS
Qty: 0 | Refills: 0 | Status: DISCONTINUED | OUTPATIENT
Start: 2018-12-05 | End: 2018-12-06

## 2018-12-05 RX ORDER — IRON SUCROSE 20 MG/ML
200 INJECTION, SOLUTION INTRAVENOUS EVERY 24 HOURS
Qty: 0 | Refills: 0 | Status: DISCONTINUED | OUTPATIENT
Start: 2018-12-05 | End: 2018-12-05

## 2018-12-05 RX ORDER — DOCUSATE SODIUM 100 MG
1 CAPSULE ORAL
Qty: 60 | Refills: 0 | OUTPATIENT
Start: 2018-12-05 | End: 2019-01-03

## 2018-12-05 RX ORDER — SENNA PLUS 8.6 MG/1
2 TABLET ORAL
Qty: 60 | Refills: 0 | OUTPATIENT
Start: 2018-12-05 | End: 2019-01-03

## 2018-12-05 RX ORDER — IRON SUCROSE 20 MG/ML
200 INJECTION, SOLUTION INTRAVENOUS EVERY 24 HOURS
Qty: 0 | Refills: 0 | Status: DISCONTINUED | OUTPATIENT
Start: 2018-12-05 | End: 2018-12-06

## 2018-12-05 RX ADMIN — Medication 600 MILLIGRAM(S): at 23:53

## 2018-12-05 RX ADMIN — IRON SUCROSE 110 MILLIGRAM(S): 20 INJECTION, SOLUTION INTRAVENOUS at 23:52

## 2018-12-05 RX ADMIN — SODIUM CHLORIDE 150 MILLILITER(S): 9 INJECTION, SOLUTION INTRAVENOUS at 23:52

## 2018-12-05 NOTE — DISCHARGE NOTE ADULT - CONDITIONS AT DISCHARGE
stable no vaginal bleeding follow up for discharge instructions explained and given Pt verbalized understanding Rt saline lock removed no sign symptom of infection noted

## 2018-12-05 NOTE — CHART NOTE - NSCHARTNOTEFT_GEN_A_CORE
GYN PA Focused Note HD#2    rpt h/h 9.8/31.6  Patient was seen and reevaluated at bedside. Results of cbc reported to the patient and recommended blood transfusion and d&c.  Patient agreed, consent signed  placed in the chart.   Will start blood transfusion  patient added to OR in the morning  keep NPO, venodynes  monitor vitals, continue pad checks  dw Dr. Prince [place] attending

## 2018-12-05 NOTE — BRIEF OPERATIVE NOTE - PROCEDURE
<<-----Click on this checkbox to enter Procedure Hysteroscopic procedure  12/05/2018    Active  BLESSING  Dilatation and curettage  12/05/2018    Active  AURELIOCONRACHEL

## 2018-12-05 NOTE — BRIEF OPERATIVE NOTE - PRE-OP DX
Leiomyoma of body of uterus  12/05/2018    Active  Alex Ford  Menorrhagia with regular cycle  12/05/2018    Active  Alex Ford

## 2018-12-05 NOTE — DISCHARGE NOTE ADULT - CARE PROVIDER_API CALL
Alex Ford), Obstetrics and Gynecology  [address] [place] [address]  Phone: [phone]  Fax: [phone]

## 2018-12-05 NOTE — DISCHARGE NOTE ADULT - MEDICATION SUMMARY - MEDICATIONS TO TAKE
I will START or STAY ON the medications listed below when I get home from the hospital:    Provera 10 mg oral tablet  -- 1 tab(s) by mouth once a day   -- Do not take this drug if you are pregnant.  It is very important that you take or use this exactly as directed.  Do not skip doses or discontinue unless directed by your doctor.  Take with food or milk.    -- Indication: For Menorrhagia with regular cycle    ferrous sulfate 325 mg (65 mg elemental iron) oral tablet  -- 1 tab(s) by mouth 3 times a day   -- Check with your doctor before becoming pregnant.  Do not chew, break, or crush.  May discolor urine or feces.    -- Indication: For Anemia    Colace 100 mg oral capsule  -- 1 cap(s) by mouth 2 times a day   -- Medication should be taken with plenty of water.    -- Indication: For constipation    senna oral tablet  -- 2 tab(s) by mouth once a day   -- Indication: For constipation

## 2018-12-05 NOTE — DISCHARGE NOTE ADULT - CARE PLAN
Principal Discharge DX:	Abnormal uterine bleeding  Goal:	d&c done  Assessment and plan of treatment:	please call dr weaver's office to set up follow up appointment  continue Provera 10mg daily for 10 days  Secondary Diagnosis:	Syncope, unspecified syncope type  Goal:	1unit blood transfusion given before d&c  Secondary Diagnosis:	Menorrhagia with regular cycle  Assessment and plan of treatment:	please call dr weaver's office to set up follow up appointment  continue Provera 10mg daily for 10 days  Secondary Diagnosis:	Uterine fibroid  Assessment and plan of treatment:	please call dr weaver's office to set up follow up appointment  continue Provera 10mg daily for 10 days Principal Discharge DX:	Abnormal uterine bleeding  Goal:	d&c done  Assessment and plan of treatment:	Continue Provera 10mg daily for 10 days  You are scheduled for an appointment at 1pm on Thursday 12/13 with Dr. Ford, please follow up with him as scheduled  Secondary Diagnosis:	Syncope, unspecified syncope type  Goal:	1unit blood transfusion given before d&c  Assessment and plan of treatment:	Take iron, folic acid, vitamin C, and prenatal vitamins. Eat iron fortified foods.   Colace/Senna sent to pharmacy as iron can be constipating  Secondary Diagnosis:	Menorrhagia with regular cycle  Assessment and plan of treatment:	as above  Secondary Diagnosis:	Uterine fibroid  Assessment and plan of treatment:	as above

## 2018-12-05 NOTE — DISCHARGE NOTE ADULT - PLAN OF CARE
d&c done please call dr weaver's office to set up follow up appointment  continue Provera 10mg daily for 10 days 1unit blood transfusion given before d&c Continue Provera 10mg daily for 10 days  You are scheduled for an appointment at 1pm on Thursday 12/13 with Dr. Ford, please follow up with him as scheduled Take iron, folic acid, vitamin C, and prenatal vitamins. Eat iron fortified foods.   Colace/Senna sent to pharmacy as iron can be constipating as above

## 2018-12-05 NOTE — DISCHARGE NOTE ADULT - PATIENT PORTAL LINK FT
You can access the [facility], offered by [facility], by registering with the following website: [email or web address]

## 2018-12-05 NOTE — CHART NOTE - NSCHARTNOTEFT_GEN_A_CORE
GYN PA Focused Note HD#2    Nurse called PA after pad check, reported to be saturated with clots after one hour.  Patient seen and evaluated at bedside. Reports bleeding is heavy with clots, palpitations when ambulating to the restroom.  gyn moderate blood on pad with minimal clots  rpt cbc in pending  Discussed with patient possible blood transfusion and/or d&c. Patient states she will think about it.  will f/u cbc  dw Dr. Prince [place] attending GYN PA Focused Note HD#2    Nurse called PA after pad check, reported to be saturated with clots after one hour.  Patient seen and evaluated at bedside. Reports bleeding is heavy with clots, palpitations when ambulating to the restroom.  gyn moderate blood on pad with minimal clots  rpt cbc in pending  Discussed with patient possible blood transfusion and/or d&c. Patient states she will think about it.  will f/u cbc, continue pad checks and monitor vitals  dw Dr. Prince [place] attending

## 2018-12-05 NOTE — DISCHARGE NOTE ADULT - ADDITIONAL INSTRUCTIONS
please call dr weaver's office to set up follow up appointment  continue Provera 10mg daily for 10 days

## 2018-12-05 NOTE — DISCHARGE NOTE ADULT - HOSPITAL COURSE
admitted for menorrhagia   witnessed syncope  +fibroid uterus:   1unit prbc given before OR   d&c done  dc home on provera 10mg daily for 10days  to f/u with dr weaver admitted for menorrhagia   witnessed syncope  +fibroid uterus:   1unit prbc given before OR   d&c done  dc home on provera 10mg daily for 10days, iron TID, and colace/senna   to f/u with dr weaver as scheduled  deamed medically stable for discharge by Dr. Morris, ob/gyn house attending

## 2018-12-05 NOTE — BRIEF OPERATIVE NOTE - POST-OP DX
Fibroids, intramural  12/05/2018    Active  Alex Ford  Menorrhagia with regular cycle  12/05/2018    Active  Alex Ford

## 2018-12-06 VITALS
OXYGEN SATURATION: 100 % | TEMPERATURE: 98 F | DIASTOLIC BLOOD PRESSURE: 63 MMHG | RESPIRATION RATE: 18 BRPM | HEART RATE: 82 BPM | SYSTOLIC BLOOD PRESSURE: 115 MMHG

## 2018-12-06 DIAGNOSIS — Z98.890 OTHER SPECIFIED POSTPROCEDURAL STATES: ICD-10-CM

## 2018-12-06 DIAGNOSIS — R55 SYNCOPE AND COLLAPSE: ICD-10-CM

## 2018-12-06 LAB
HCT VFR BLD CALC: 26.1 % — LOW (ref 34.5–45)
HCT VFR BLD CALC: 26.8 % — LOW (ref 34.5–45)
HGB BLD-MCNC: 8 G/DL — LOW (ref 11.5–15.5)
HGB BLD-MCNC: 8.2 G/DL — LOW (ref 11.5–15.5)
MCHC RBC-ENTMCNC: 28.2 PG — SIGNIFICANT CHANGE UP (ref 27–34)
MCHC RBC-ENTMCNC: 28.5 PG — SIGNIFICANT CHANGE UP (ref 27–34)
MCHC RBC-ENTMCNC: 30.4 GM/DL — LOW (ref 32–36)
MCHC RBC-ENTMCNC: 30.6 GM/DL — LOW (ref 32–36)
MCV RBC AUTO: 92.9 FL — SIGNIFICANT CHANGE UP (ref 80–100)
MCV RBC AUTO: 93 FL — SIGNIFICANT CHANGE UP (ref 80–100)
PLATELET # BLD AUTO: 142 K/UL — LOW (ref 150–400)
PLATELET # BLD AUTO: 142 K/UL — LOW (ref 150–400)
RBC # BLD: 2.81 M/UL — LOW (ref 3.8–5.2)
RBC # BLD: 2.89 M/UL — LOW (ref 3.8–5.2)
RBC # FLD: 18.9 % — HIGH (ref 10.3–14.5)
RBC # FLD: 19.1 % — HIGH (ref 10.3–14.5)
WBC # BLD: 5.6 K/UL — SIGNIFICANT CHANGE UP (ref 3.8–10.5)
WBC # BLD: 5.8 K/UL — SIGNIFICANT CHANGE UP (ref 3.8–10.5)
WBC # FLD AUTO: 5.6 K/UL — SIGNIFICANT CHANGE UP (ref 3.8–10.5)
WBC # FLD AUTO: 5.8 K/UL — SIGNIFICANT CHANGE UP (ref 3.8–10.5)

## 2018-12-06 PROCEDURE — 85730 THROMBOPLASTIN TIME PARTIAL: CPT

## 2018-12-06 PROCEDURE — 86923 COMPATIBILITY TEST ELECTRIC: CPT

## 2018-12-06 PROCEDURE — 36430 TRANSFUSION BLD/BLD COMPNT: CPT

## 2018-12-06 PROCEDURE — 82962 GLUCOSE BLOOD TEST: CPT

## 2018-12-06 PROCEDURE — 85610 PROTHROMBIN TIME: CPT

## 2018-12-06 PROCEDURE — 88305 TISSUE EXAM BY PATHOLOGIST: CPT

## 2018-12-06 PROCEDURE — 99285 EMERGENCY DEPT VISIT HI MDM: CPT | Mod: 25

## 2018-12-06 PROCEDURE — 86901 BLOOD TYPING SEROLOGIC RH(D): CPT

## 2018-12-06 PROCEDURE — 93005 ELECTROCARDIOGRAM TRACING: CPT

## 2018-12-06 PROCEDURE — 80053 COMPREHEN METABOLIC PANEL: CPT

## 2018-12-06 PROCEDURE — 86850 RBC ANTIBODY SCREEN: CPT

## 2018-12-06 PROCEDURE — 85027 COMPLETE CBC AUTOMATED: CPT

## 2018-12-06 PROCEDURE — 76830 TRANSVAGINAL US NON-OB: CPT

## 2018-12-06 PROCEDURE — 86900 BLOOD TYPING SEROLOGIC ABO: CPT

## 2018-12-06 PROCEDURE — 36415 COLL VENOUS BLD VENIPUNCTURE: CPT

## 2018-12-06 PROCEDURE — 84702 CHORIONIC GONADOTROPIN TEST: CPT

## 2018-12-06 PROCEDURE — 76856 US EXAM PELVIC COMPLETE: CPT

## 2018-12-06 PROCEDURE — P9040: CPT

## 2018-12-06 RX ORDER — MEDROXYPROGESTERONE ACETATE 150 MG/ML
1 INJECTION, SUSPENSION, EXTENDED RELEASE INTRAMUSCULAR
Qty: 10 | Refills: 0 | OUTPATIENT
Start: 2018-12-06 | End: 2018-12-15

## 2018-12-06 RX ORDER — SENNA PLUS 8.6 MG/1
2 TABLET ORAL
Qty: 60 | Refills: 0
Start: 2018-12-06 | End: 2019-01-04

## 2018-12-06 RX ORDER — FERROUS SULFATE 325(65) MG
1 TABLET ORAL
Qty: 90 | Refills: 0
Start: 2018-12-06 | End: 2019-01-04

## 2018-12-06 RX ORDER — MEDROXYPROGESTERONE ACETATE 150 MG/ML
1 INJECTION, SUSPENSION, EXTENDED RELEASE INTRAMUSCULAR
Qty: 10 | Refills: 0
Start: 2018-12-06 | End: 2018-12-15

## 2018-12-06 RX ORDER — DOCUSATE SODIUM 100 MG
1 CAPSULE ORAL
Qty: 60 | Refills: 0
Start: 2018-12-06 | End: 2019-01-04

## 2018-12-06 RX ADMIN — Medication 600 MILLIGRAM(S): at 02:00

## 2018-12-06 NOTE — PROGRESS NOTE ADULT - SUBJECTIVE AND OBJECTIVE BOX
Patient seen at bedside resting comfortably. Reports very minimal vaginal spotting after procedure yesterday. Denies dizziness, chest pain, SOB, abdominal pain, N/V.     Vital Signs Last 24 Hrs  T(C): 36.7 (06 Dec 2018 06:00), Max: 37 (05 Dec 2018 14:31)  T(F): 98 (06 Dec 2018 06:00), Max: 98.6 (05 Dec 2018 14:31)  HR: 77 (06 Dec 2018 06:00) (71 - 93)  BP: 90/50 (06 Dec 2018 06:00) (83/43 - 102/51)  BP(mean): --  RR: 18 (06 Dec 2018 06:00) (14 - 18)  SpO2: 100% (06 Dec 2018 06:00) (97% - 100%)    Gen: A&O x 3, NAD  Chest: CTA B/L  Cardiac: S1,S2  RRR  Abdomen: +BS; soft; Nontender, nondistended  Gyn: cachorro-pad appears dry   Extremities: Nontender, no worsening edema                          8.0    5.6   )-----------( 142      ( 06 Dec 2018 07:00 )             26.1     12-04    140  |  107  |  12  ----------------------------<  90  3.8   |  24  |  0.82    Ca    8.4      04 Dec 2018 18:10    TPro  7.9  /  Alb  3.7  /  TBili  0.3  /  DBili  x   /  AST  20  /  ALT  31  /  AlkPhos  68  12-04      A/P: 27yo F HD#3 s/p syncope from symptomatic anemia secondary to menorrhagia, POD #1 s/p d&c procedure. Hemodynamically stable.  -Follow up repeat CBC @12pm to ensure hgb stable  -Continue close monitoring  - Monitor vitals  - Possible dc home this PM.   - D/w libby [place] attending
Patient seen at bedside resting comfortably offers no new complaints at this time. Patient is NPO since yesterday.  Denies HA, CP, SOB, N/V/D,  no bm; dizziness, palpitations, worsening abdominal pain, fever or chills. As per RN patient has continued to saturate a pad per hour. 1 unit PRBC completed.     Vital Signs Last 24 Hrs  T(C): 36.7 (05 Dec 2018 04:48), Max: 37 (05 Dec 2018 02:00)  T(F): 98 (05 Dec 2018 04:48), Max: 98.6 (05 Dec 2018 02:00)  HR: 75 (05 Dec 2018 04:48) (68 - 79)  BP: 85/46 (05 Dec 2018 04:48) (85/46 - 118/70)  RR: 18 (05 Dec 2018 04:48) (15 - 18)  SpO2: 100% (05 Dec 2018 04:48) (68% - 100%)    Gen: A&O x 3, NAD  Chest: CTA B/L  Cardiac: S1,S2  RRR  Breast: Soft, nontender, nonengorged  Abdomen: +BS; soft; Nontender, nondistended  Gyn: no vaginal bleeding   Extremities: Nontender, no worsening edema                          9.8    5.8   )-----------( 152      ( 05 Dec 2018 03:05 )             31.6     12-04    140  |  107  |  12  ----------------------------<  90  3.8   |  24  |  0.82    Ca    8.4      04 Dec 2018 18:10    TPro  7.9  /  Alb  3.7  /  TBili  0.3  /  DBili  x   /  AST  20  /  ALT  31  /  AlkPhos  68  12-04

## 2018-12-06 NOTE — CHART NOTE - NSCHARTNOTEFT_GEN_A_CORE
repeat CBC drawn  Hgb 8.2, stable  Patient seen at bedside  Still asymptomatic, denies dizziness, chest pain, SOB  Patient instructed that she will go home with  qd x 10days, iron tid, and colace  She will follow up with Dr. Ford on Thursday 12/13  Patient agrees and understands plan  Discharge home.   D/w Dr. Morris, [place] attending

## 2018-12-06 NOTE — PROGRESS NOTE ADULT - PROBLEM SELECTOR PLAN 2
-Follow up repeat CBC @12pm to ensure hgb stable  -Continue close monitoring  - Monitor vitals  - Possible dc home this PM.   - D/w Dr. Morris, house attending

## 2018-12-06 NOTE — PROGRESS NOTE ADULT - ASSESSMENT
A/P: 27yo F HD#3 s/p syncope from symptomatic anemia secondary to menorrhagia, POD #1 s/p d&c procedure. Hemodynamically stable.
HD 2, admitted for menorrhagia with witnessed syncope in ER; symptomatic anemia; s/p 1 unit PRBC

## 2019-04-02 ENCOUNTER — TRANSCRIPTION ENCOUNTER (OUTPATIENT)
Age: 29
End: 2019-04-02

## 2019-04-17 PROBLEM — N83.209 UNSPECIFIED OVARIAN CYST, UNSPECIFIED SIDE: Chronic | Status: ACTIVE | Noted: 2018-12-04

## 2019-04-17 PROBLEM — Z00.00 ENCOUNTER FOR PREVENTIVE HEALTH EXAMINATION: Status: ACTIVE | Noted: 2019-04-17

## 2019-05-14 ENCOUNTER — ASOB RESULT (OUTPATIENT)
Age: 29
End: 2019-05-14

## 2019-05-14 ENCOUNTER — APPOINTMENT (OUTPATIENT)
Dept: ANTEPARTUM | Facility: CLINIC | Age: 29
End: 2019-05-14
Payer: COMMERCIAL

## 2019-05-14 PROCEDURE — 76801 OB US < 14 WKS SINGLE FETUS: CPT

## 2019-05-14 PROCEDURE — 76813 OB US NUCHAL MEAS 1 GEST: CPT

## 2019-05-14 PROCEDURE — 36416 COLLJ CAPILLARY BLOOD SPEC: CPT

## 2019-06-02 ENCOUNTER — TRANSCRIPTION ENCOUNTER (OUTPATIENT)
Age: 29
End: 2019-06-02

## 2019-07-08 ENCOUNTER — ASOB RESULT (OUTPATIENT)
Age: 29
End: 2019-07-08

## 2019-07-08 ENCOUNTER — APPOINTMENT (OUTPATIENT)
Dept: ANTEPARTUM | Facility: CLINIC | Age: 29
End: 2019-07-08
Payer: COMMERCIAL

## 2019-07-08 PROCEDURE — 76811 OB US DETAILED SNGL FETUS: CPT

## 2019-08-20 ENCOUNTER — OUTPATIENT (OUTPATIENT)
Dept: OUTPATIENT SERVICES | Facility: HOSPITAL | Age: 29
LOS: 1 days | Discharge: HOME | End: 2019-08-20

## 2019-08-20 DIAGNOSIS — Z36.89 ENCOUNTER FOR OTHER SPECIFIED ANTENATAL SCREENING: ICD-10-CM

## 2019-08-20 DIAGNOSIS — Z98.89 OTHER SPECIFIED POSTPROCEDURAL STATES: Chronic | ICD-10-CM

## 2019-09-09 NOTE — ED PROVIDER NOTE - PRINCIPAL DIAGNOSIS
Spine appears normal, range of motion is not limited, no muscle or joint tenderness Contusion of left foot, initial encounter

## 2019-10-17 ENCOUNTER — OUTPATIENT (OUTPATIENT)
Dept: INPATIENT UNIT | Facility: HOSPITAL | Age: 29
LOS: 1 days | Discharge: HOME | End: 2019-10-17
Payer: COMMERCIAL

## 2019-10-17 VITALS — DIASTOLIC BLOOD PRESSURE: 55 MMHG | SYSTOLIC BLOOD PRESSURE: 111 MMHG | HEART RATE: 72 BPM

## 2019-10-17 VITALS
RESPIRATION RATE: 16 BRPM | DIASTOLIC BLOOD PRESSURE: 59 MMHG | HEART RATE: 90 BPM | TEMPERATURE: 98 F | SYSTOLIC BLOOD PRESSURE: 94 MMHG

## 2019-10-17 DIAGNOSIS — Z98.890 OTHER SPECIFIED POSTPROCEDURAL STATES: Chronic | ICD-10-CM

## 2019-10-17 DIAGNOSIS — Z98.89 OTHER SPECIFIED POSTPROCEDURAL STATES: Chronic | ICD-10-CM

## 2019-10-17 DIAGNOSIS — Z3A.00 WEEKS OF GESTATION OF PREGNANCY NOT SPECIFIED: ICD-10-CM

## 2019-10-17 DIAGNOSIS — O26.899 OTHER SPECIFIED PREGNANCY RELATED CONDITIONS, UNSPECIFIED TRIMESTER: ICD-10-CM

## 2019-10-17 LAB
APPEARANCE UR: SIGNIFICANT CHANGE UP
BACTERIA # UR AUTO: SIGNIFICANT CHANGE UP
BILIRUB UR-MCNC: NEGATIVE — SIGNIFICANT CHANGE UP
BLOOD UR QL VISUAL: NEGATIVE — SIGNIFICANT CHANGE UP
COLOR SPEC: YELLOW — SIGNIFICANT CHANGE UP
GLUCOSE UR-MCNC: NEGATIVE — SIGNIFICANT CHANGE UP
HYALINE CASTS # UR AUTO: NEGATIVE — SIGNIFICANT CHANGE UP
KETONES UR-MCNC: SIGNIFICANT CHANGE UP
LEUKOCYTE ESTERASE UR-ACNC: SIGNIFICANT CHANGE UP
NITRITE UR-MCNC: NEGATIVE — SIGNIFICANT CHANGE UP
PH UR: 6.5 — SIGNIFICANT CHANGE UP (ref 5–8)
PROT UR-MCNC: 20 — SIGNIFICANT CHANGE UP
RBC CASTS # UR COMP ASSIST: SIGNIFICANT CHANGE UP (ref 0–?)
SP GR SPEC: 1.02 — SIGNIFICANT CHANGE UP (ref 1–1.04)
SQUAMOUS # UR AUTO: SIGNIFICANT CHANGE UP
UROBILINOGEN FLD QL: NORMAL — SIGNIFICANT CHANGE UP
WBC UR QL: HIGH (ref 0–?)

## 2019-10-17 PROCEDURE — 59025 FETAL NON-STRESS TEST: CPT | Mod: 26

## 2019-10-17 RX ORDER — SODIUM CHLORIDE 9 MG/ML
1000 INJECTION, SOLUTION INTRAVENOUS ONCE
Refills: 0 | Status: COMPLETED | OUTPATIENT
Start: 2019-10-17 | End: 2019-10-17

## 2019-10-17 RX ORDER — ACETAMINOPHEN 500 MG
975 TABLET ORAL ONCE
Refills: 0 | Status: COMPLETED | OUTPATIENT
Start: 2019-10-17 | End: 2019-10-17

## 2019-10-17 RX ADMIN — SODIUM CHLORIDE 1000 MILLILITER(S): 9 INJECTION, SOLUTION INTRAVENOUS at 15:48

## 2019-10-17 RX ADMIN — Medication 975 MILLIGRAM(S): at 15:37

## 2019-10-17 NOTE — OB PROVIDER TRIAGE NOTE - NSHPLABSRESULTS_GEN_ALL_CORE
urinalysis pending urinalysis pending    Urinalysis Basic - ( 17 Oct 2019 15:16 )    Color: YELLOW / Appearance: Lt TURBID / S.017 / pH: 6.5  Gluc: NEGATIVE / Ketone: TRACE  / Bili: NEGATIVE / Urobili: NORMAL   Blood: NEGATIVE / Protein: 20 / Nitrite: NEGATIVE   Leuk Esterase: TRACE / RBC: 3-5 / WBC 6-10   Sq Epi: FEW / Non Sq Epi: x / Bacteria: FEW urinalysis pending    Urinalysis Basic - ( 17 Oct 2019 15:16 )    Color: YELLOW / Appearance: Lt TURBID / S.017 / pH: 6.5  Gluc: NEGATIVE / Ketone: TRACE  / Bili: NEGATIVE / Urobili: NORMAL   Blood: NEGATIVE / Protein: 20 / Nitrite: NEGATIVE   Leuk Esterase: TRACE / RBC: 3-5 / WBC 6-10   Sq Epi: FEW / Non Sq Epi: x / Bacteria: FEW    urinalysis reviewed with Dr Bowles

## 2019-10-17 NOTE — OB RN TRIAGE NOTE - PSH
H/O vascular surgery  2011 Pt states blood vessel ruptured in abdomen when she was 20  History of dilation and curettage  Dec.5th 2018 due to fibroid

## 2019-10-17 NOTE — OB PROVIDER TRIAGE NOTE - NSOBPROVIDERNOTE_OBGYN_ALL_OB_FT
28 y/o pt 34.2 weeks  of Dr Moody with c/o of contant, sharp upper right quadrant pain related to fibroid since 11pm last night. pt reports 7/10 pain but has not taken any pain medication. as per patient, fibroid has only caused mild pain in 1st trimester. pt denies bleeding, LOF, and contractions. pt denies n/v/d, fever or chills.  +fetal movement   ATU sonogram:  RL fibroid 10.8x7x10.8  KATIE cervix fibroid 6x6x6.2  AP uncomplicated thus far.    NKDA  med/surg hx:   H/O vascular surgery   Pt states blood vessel ruptured in abdomen when she was 20  History of dilation and curettage  Dec.5th 2018 due to fibroid  GYN hx:  RL fibroid 10.8x7x10.8  KATIE cervix fibroid 6x6x6.2  uterine cyst  OB hx:  top x2  Meds: PNV      Abdomen soft, tenderness to lateral right upper quadrant  SVE: cervix closed/high  no bleeding noted  TAS: vertex presentation    Will continue to monitor   -NST in progress  -Tylenol 975 mg for fibroid discomfort  -IV fluids for irregular contractions 30 y/o pt 34.2 weeks  of Dr Moody with c/o of constant, sharp upper right quadrant pain related to fibroid since 11pm last night. pt reports 7/10 pain but has not taken any pain medication. as per patient, no issues with fibroids this pregnancy. pt denies bleeding, LOF, and contractions. pt denies n/v/d, fever or chills.  +fetal movement   ATU sonogram:  RL fibroid 10.8x7x10.8  KATIE cervix fibroid 6x6x6.2  AP uncomplicated thus far.    NKDA  med/surg hx:   H/O vascular surgery   Pt states blood vessel ruptured in abdomen when she was 20  History of dilation and curettage  Dec.5th 2018 due to fibroid  GYN hx:  RL fibroid 10.8x7x10.8  KATIE cervix fibroid 6x6x6.2  uterine cyst  OB hx:  top x2  Meds: PNV      Abdomen soft, tenderness to lateral right upper quadrant  SVE: cervix closed/high  no bleeding noted  TAS: vertex presentation    Will continue to monitor   -NST in progress  -Tylenol 975 mg for fibroid discomfort  -IV fluids for irregular contractions    Addendum @7737  repeat SVE: cervix closed    d/w Dr Bowles 28 y/o pt 34.2 weeks  of Dr Moody with c/o of constant, sharp upper right quadrant pain related to fibroid since 11pm last night. pt reports 7/10 pain but has not taken any pain medication. as per patient, no issues with fibroids this pregnancy. pt denies bleeding, LOF, and contractions. pt denies n/v/d, fever or chills.  +fetal movement   ATU sonogram:  RL fibroid 10.8x7x10.8  KATIE cervix fibroid 6x6x6.2  AP uncomplicated thus far.    NKDA  med/surg hx:   H/O vascular surgery   Pt states blood vessel ruptured in abdomen when she was 20  History of dilation and curettage  Dec.5th 2018 due to fibroid  GYN hx:  RL fibroid 10.8x7x10.8  KATIE cervix fibroid 6x6x6.2  uterine cyst  OB hx:  top x2  Meds: PNV      Abdomen soft, tenderness to lateral right upper quadrant  SVE: cervix closed/high  no bleeding noted  TAS: vertex presentation    Will continue to monitor   -NST in progress  -Tylenol 975 mg for fibroid discomfort  -IV fluids for irregular contractions    Addendum @1715  repeat SVE: cervix closed  pt reports relief with Tylenol     maternal and fetal status reassuring  no evidence of pre term labor  most likely pain secondary to fibroid  d/w Dr Bowles  -pt cleared for discharge  -follow up with scheduled appointment  -fetal kick counts reviewed  -labor precautions reviewed  -pt to increase hydration  -Tylenol every 6 hours as needed

## 2019-10-17 NOTE — OB PROVIDER TRIAGE NOTE - NSHPPHYSICALEXAM_GEN_ALL_CORE
Vital Signs Last 24 Hrs  T(C): 36.6 (17 Oct 2019 12:31), Max: 36.6 (17 Oct 2019 12:31)  T(F): 97.9 (17 Oct 2019 12:31), Max: 97.9 (17 Oct 2019 12:31)  HR: 81 (17 Oct 2019 15:28) (76 - 90)  BP: 101/59 (17 Oct 2019 15:28) (94/59 - 112/53)  BP(mean): --  RR: 16 (17 Oct 2019 12:31) (16 - 16)  SpO2: --    Abdomen soft, tenderness to right lateral upper quadrant  SVE: cervix close/high  no bleeding noted  TAS: vertex presentation    NST in progress Vital Signs Last 24 Hrs  T(C): 36.6 (17 Oct 2019 12:31), Max: 36.6 (17 Oct 2019 12:31)  T(F): 97.9 (17 Oct 2019 12:31), Max: 97.9 (17 Oct 2019 12:31)  HR: 81 (17 Oct 2019 15:28) (76 - 90)  BP: 101/59 (17 Oct 2019 15:28) (94/59 - 112/53)  BP(mean): --  RR: 16 (17 Oct 2019 12:31) (16 - 16)  SpO2: --    Abdomen soft, tenderness to right lateral upper quadrant  SVE: cervix close/high  no bleeding noted  TAS: vertex presentation    NST in progress    NST: reactive with moderate variability, cat 1 tracing  toco irregular contractions noted Vital Signs Last 24 Hrs  T(C): 36.6 (17 Oct 2019 12:31), Max: 36.6 (17 Oct 2019 12:31)  T(F): 97.9 (17 Oct 2019 12:31), Max: 97.9 (17 Oct 2019 12:31)  HR: 81 (17 Oct 2019 15:28) (76 - 90)  BP: 101/59 (17 Oct 2019 15:28) (94/59 - 112/53)  BP(mean): --  RR: 16 (17 Oct 2019 12:31) (16 - 16)  SpO2: --    Abdomen soft, tenderness to right lateral upper quadrant  SVE: cervix close/high  no bleeding noted  TAS: vertex presentation    NST in progress    NST: reactive with moderate variability, cat 1 tracing  toco irregular contractions noted    repeat SVE @ 1715: cervix closed

## 2019-10-17 NOTE — OB PROVIDER TRIAGE NOTE - ADDITIONAL INSTRUCTIONS
-follow up with scheduled appointment  -fetal kick counts reviewed  -labor precautions reviewed  -pt to increase hydration  -Tylenol every 6 hours as needed

## 2019-10-17 NOTE — OB PROVIDER TRIAGE NOTE - HISTORY OF PRESENT ILLNESS
28 y/o pt 34.2 weeks  of Dr Moody with c/o of contant, sharp upper right quadrant pain related to fibroid since 11pm last night. pt reports 7/10 pain but has not taken any pain medication. as per patient, fibroid has only caused mild pain in 1st trimester. pt denies bleeding, LOF, and contractions. pt denies n/v/d, fever or chills.  +fetal movement   ATU sonogram:  RL fibroid 10.8x7x10.8  KATIE cervix fibroid 6x6x6.2  AP uncomplicated thus far. 28 y/o pt 34.2 weeks  of Dr Moody with c/o of contant, sharp upper right quadrant pain related to fibroid since 11pm last night. pt reports 7/10 pain but has not taken any pain medication. pt reports no issues with fibroids this pregnancy.  pt denies bleeding, LOF, and contractions. pt denies n/v/d, fever or chills.  +fetal movement   ATU sonogram:  RL fibroid 10.8x7x10.8  KATIE cervix fibroid 6x6x6.2  AP uncomplicated thus far.

## 2019-11-18 ENCOUNTER — INPATIENT (INPATIENT)
Facility: HOSPITAL | Age: 29
LOS: 1 days | Discharge: HOME | End: 2019-11-20
Attending: OBSTETRICS & GYNECOLOGY | Admitting: OBSTETRICS & GYNECOLOGY

## 2019-11-18 ENCOUNTER — TRANSCRIPTION ENCOUNTER (OUTPATIENT)
Age: 29
End: 2019-11-18

## 2019-11-18 VITALS
TEMPERATURE: 98 F | HEART RATE: 83 BPM | SYSTOLIC BLOOD PRESSURE: 107 MMHG | DIASTOLIC BLOOD PRESSURE: 77 MMHG | RESPIRATION RATE: 18 BRPM

## 2019-11-18 DIAGNOSIS — Z98.89 OTHER SPECIFIED POSTPROCEDURAL STATES: Chronic | ICD-10-CM

## 2019-11-18 DIAGNOSIS — Z98.890 OTHER SPECIFIED POSTPROCEDURAL STATES: Chronic | ICD-10-CM

## 2019-11-18 DIAGNOSIS — O26.899 OTHER SPECIFIED PREGNANCY RELATED CONDITIONS, UNSPECIFIED TRIMESTER: ICD-10-CM

## 2019-11-18 DIAGNOSIS — Z3A.00 WEEKS OF GESTATION OF PREGNANCY NOT SPECIFIED: ICD-10-CM

## 2019-11-18 PROBLEM — Z87.42 PERSONAL HISTORY OF OTHER DISEASES OF THE FEMALE GENITAL TRACT: Chronic | Status: ACTIVE | Noted: 2019-10-17

## 2019-11-18 PROBLEM — D21.9 BENIGN NEOPLASM OF CONNECTIVE AND OTHER SOFT TISSUE, UNSPECIFIED: Chronic | Status: ACTIVE | Noted: 2019-10-17

## 2019-11-18 LAB
BASOPHILS # BLD AUTO: 0.02 K/UL — SIGNIFICANT CHANGE UP (ref 0–0.2)
BASOPHILS NFR BLD AUTO: 0.2 % — SIGNIFICANT CHANGE UP (ref 0–2)
BLD GP AB SCN SERPL QL: NEGATIVE — SIGNIFICANT CHANGE UP
EOSINOPHIL # BLD AUTO: 0.04 K/UL — SIGNIFICANT CHANGE UP (ref 0–0.5)
EOSINOPHIL NFR BLD AUTO: 0.4 % — SIGNIFICANT CHANGE UP (ref 0–6)
HCT VFR BLD CALC: 38.6 % — SIGNIFICANT CHANGE UP (ref 34.5–45)
HGB BLD-MCNC: 12.3 G/DL — SIGNIFICANT CHANGE UP (ref 11.5–15.5)
IMM GRANULOCYTES NFR BLD AUTO: 0.3 % — SIGNIFICANT CHANGE UP (ref 0–1.5)
LYMPHOCYTES # BLD AUTO: 2.14 K/UL — SIGNIFICANT CHANGE UP (ref 1–3.3)
LYMPHOCYTES # BLD AUTO: 22.5 % — SIGNIFICANT CHANGE UP (ref 13–44)
MCHC RBC-ENTMCNC: 30.9 PG — SIGNIFICANT CHANGE UP (ref 27–34)
MCHC RBC-ENTMCNC: 31.9 % — LOW (ref 32–36)
MCV RBC AUTO: 97 FL — SIGNIFICANT CHANGE UP (ref 80–100)
MONOCYTES # BLD AUTO: 0.83 K/UL — SIGNIFICANT CHANGE UP (ref 0–0.9)
MONOCYTES NFR BLD AUTO: 8.7 % — SIGNIFICANT CHANGE UP (ref 2–14)
NEUTROPHILS # BLD AUTO: 6.47 K/UL — SIGNIFICANT CHANGE UP (ref 1.8–7.4)
NEUTROPHILS NFR BLD AUTO: 67.9 % — SIGNIFICANT CHANGE UP (ref 43–77)
NRBC # FLD: 0 K/UL — SIGNIFICANT CHANGE UP (ref 0–0)
PLATELET # BLD AUTO: 167 K/UL — SIGNIFICANT CHANGE UP (ref 150–400)
PMV BLD: 13.4 FL — HIGH (ref 7–13)
RBC # BLD: 3.98 M/UL — SIGNIFICANT CHANGE UP (ref 3.8–5.2)
RBC # FLD: 14.2 % — SIGNIFICANT CHANGE UP (ref 10.3–14.5)
RH IG SCN BLD-IMP: POSITIVE — SIGNIFICANT CHANGE UP
RH IG SCN BLD-IMP: POSITIVE — SIGNIFICANT CHANGE UP
T PALLIDUM AB TITR SER: NEGATIVE — SIGNIFICANT CHANGE UP
WBC # BLD: 9.53 K/UL — SIGNIFICANT CHANGE UP (ref 3.8–10.5)
WBC # FLD AUTO: 9.53 K/UL — SIGNIFICANT CHANGE UP (ref 3.8–10.5)

## 2019-11-18 RX ORDER — KETOROLAC TROMETHAMINE 30 MG/ML
30 SYRINGE (ML) INJECTION ONCE
Refills: 0 | Status: DISCONTINUED | OUTPATIENT
Start: 2019-11-18 | End: 2019-11-18

## 2019-11-18 RX ORDER — SIMETHICONE 80 MG/1
80 TABLET, CHEWABLE ORAL EVERY 4 HOURS
Refills: 0 | Status: DISCONTINUED | OUTPATIENT
Start: 2019-11-18 | End: 2019-11-20

## 2019-11-18 RX ORDER — AER TRAVELER 0.5 G/1
1 SOLUTION RECTAL; TOPICAL EVERY 4 HOURS
Refills: 0 | Status: DISCONTINUED | OUTPATIENT
Start: 2019-11-18 | End: 2019-11-20

## 2019-11-18 RX ORDER — GLYCERIN ADULT
1 SUPPOSITORY, RECTAL RECTAL AT BEDTIME
Refills: 0 | Status: DISCONTINUED | OUTPATIENT
Start: 2019-11-18 | End: 2019-11-20

## 2019-11-18 RX ORDER — SODIUM CHLORIDE 9 MG/ML
3 INJECTION INTRAMUSCULAR; INTRAVENOUS; SUBCUTANEOUS EVERY 8 HOURS
Refills: 0 | Status: DISCONTINUED | OUTPATIENT
Start: 2019-11-18 | End: 2019-11-20

## 2019-11-18 RX ORDER — DIBUCAINE 1 %
1 OINTMENT (GRAM) RECTAL EVERY 6 HOURS
Refills: 0 | Status: DISCONTINUED | OUTPATIENT
Start: 2019-11-18 | End: 2019-11-20

## 2019-11-18 RX ORDER — OXYTOCIN 10 UNIT/ML
333.33 VIAL (ML) INJECTION
Qty: 20 | Refills: 0 | Status: COMPLETED | OUTPATIENT
Start: 2019-11-18 | End: 2019-11-18

## 2019-11-18 RX ORDER — IBUPROFEN 200 MG
1 TABLET ORAL
Qty: 0 | Refills: 0 | DISCHARGE
Start: 2019-11-18

## 2019-11-18 RX ORDER — SODIUM CHLORIDE 9 MG/ML
1000 INJECTION, SOLUTION INTRAVENOUS ONCE
Refills: 0 | Status: COMPLETED | OUTPATIENT
Start: 2019-11-18 | End: 2019-11-18

## 2019-11-18 RX ORDER — MAGNESIUM HYDROXIDE 400 MG/1
30 TABLET, CHEWABLE ORAL
Refills: 0 | Status: DISCONTINUED | OUTPATIENT
Start: 2019-11-18 | End: 2019-11-20

## 2019-11-18 RX ORDER — PRAMOXINE HYDROCHLORIDE 150 MG/15G
1 AEROSOL, FOAM RECTAL EVERY 4 HOURS
Refills: 0 | Status: DISCONTINUED | OUTPATIENT
Start: 2019-11-18 | End: 2019-11-20

## 2019-11-18 RX ORDER — IBUPROFEN 200 MG
600 TABLET ORAL EVERY 6 HOURS
Refills: 0 | Status: COMPLETED | OUTPATIENT
Start: 2019-11-18 | End: 2020-10-16

## 2019-11-18 RX ORDER — BENZOCAINE 10 %
1 GEL (GRAM) MUCOUS MEMBRANE EVERY 6 HOURS
Refills: 0 | Status: DISCONTINUED | OUTPATIENT
Start: 2019-11-18 | End: 2019-11-20

## 2019-11-18 RX ORDER — LANOLIN
1 OINTMENT (GRAM) TOPICAL EVERY 6 HOURS
Refills: 0 | Status: DISCONTINUED | OUTPATIENT
Start: 2019-11-18 | End: 2019-11-20

## 2019-11-18 RX ORDER — DIPHENHYDRAMINE HCL 50 MG
25 CAPSULE ORAL EVERY 6 HOURS
Refills: 0 | Status: DISCONTINUED | OUTPATIENT
Start: 2019-11-18 | End: 2019-11-20

## 2019-11-18 RX ORDER — OXYCODONE HYDROCHLORIDE 5 MG/1
5 TABLET ORAL ONCE
Refills: 0 | Status: DISCONTINUED | OUTPATIENT
Start: 2019-11-18 | End: 2019-11-20

## 2019-11-18 RX ORDER — AMPICILLIN TRIHYDRATE 250 MG
1 CAPSULE ORAL EVERY 4 HOURS
Refills: 0 | Status: DISCONTINUED | OUTPATIENT
Start: 2019-11-18 | End: 2019-11-18

## 2019-11-18 RX ORDER — AMPICILLIN TRIHYDRATE 250 MG
2 CAPSULE ORAL ONCE
Refills: 0 | Status: COMPLETED | OUTPATIENT
Start: 2019-11-18 | End: 2019-11-18

## 2019-11-18 RX ORDER — OXYCODONE HYDROCHLORIDE 5 MG/1
5 TABLET ORAL
Refills: 0 | Status: DISCONTINUED | OUTPATIENT
Start: 2019-11-18 | End: 2019-11-20

## 2019-11-18 RX ORDER — SODIUM CHLORIDE 9 MG/ML
1000 INJECTION, SOLUTION INTRAVENOUS
Refills: 0 | Status: DISCONTINUED | OUTPATIENT
Start: 2019-11-18 | End: 2019-11-18

## 2019-11-18 RX ORDER — ACETAMINOPHEN 500 MG
975 TABLET ORAL
Refills: 0 | Status: DISCONTINUED | OUTPATIENT
Start: 2019-11-18 | End: 2019-11-20

## 2019-11-18 RX ORDER — TETANUS TOXOID, REDUCED DIPHTHERIA TOXOID AND ACELLULAR PERTUSSIS VACCINE, ADSORBED 5; 2.5; 8; 8; 2.5 [IU]/.5ML; [IU]/.5ML; UG/.5ML; UG/.5ML; UG/.5ML
0.5 SUSPENSION INTRAMUSCULAR ONCE
Refills: 0 | Status: DISCONTINUED | OUTPATIENT
Start: 2019-11-18 | End: 2019-11-20

## 2019-11-18 RX ORDER — IBUPROFEN 200 MG
600 TABLET ORAL EVERY 6 HOURS
Refills: 0 | Status: DISCONTINUED | OUTPATIENT
Start: 2019-11-18 | End: 2019-11-20

## 2019-11-18 RX ORDER — ACETAMINOPHEN 500 MG
3 TABLET ORAL
Qty: 0 | Refills: 0 | DISCHARGE
Start: 2019-11-18

## 2019-11-18 RX ORDER — HYDROCORTISONE 1 %
1 OINTMENT (GRAM) TOPICAL EVERY 6 HOURS
Refills: 0 | Status: DISCONTINUED | OUTPATIENT
Start: 2019-11-18 | End: 2019-11-20

## 2019-11-18 RX ADMIN — Medication 1 TABLET(S): at 15:23

## 2019-11-18 RX ADMIN — Medication 30 MILLIGRAM(S): at 08:51

## 2019-11-18 RX ADMIN — Medication 600 MILLIGRAM(S): at 18:37

## 2019-11-18 RX ADMIN — Medication 216 GRAM(S): at 04:15

## 2019-11-18 RX ADMIN — Medication 30 MILLIGRAM(S): at 09:03

## 2019-11-18 RX ADMIN — SODIUM CHLORIDE 125 MILLILITER(S): 9 INJECTION, SOLUTION INTRAVENOUS at 04:16

## 2019-11-18 RX ADMIN — Medication 1000 MILLIUNIT(S)/MIN: at 08:50

## 2019-11-18 RX ADMIN — Medication 600 MILLIGRAM(S): at 19:30

## 2019-11-18 RX ADMIN — SODIUM CHLORIDE 3 MILLILITER(S): 9 INJECTION INTRAMUSCULAR; INTRAVENOUS; SUBCUTANEOUS at 14:00

## 2019-11-18 RX ADMIN — SODIUM CHLORIDE 1000 MILLILITER(S): 9 INJECTION, SOLUTION INTRAVENOUS at 03:16

## 2019-11-18 NOTE — DISCHARGE NOTE OB - CARE PROVIDER_API CALL
M[place])  [place] [address]  Phone: [phone]  Fax: [phone]  Established Patient  Follow Up Time:

## 2019-11-18 NOTE — OB PROVIDER H&P - NS_OBGYNHISTORY_OBGYN_ALL_OB_FT
GYN: Fibroid, no ctx          Ovarian cysts  OB: TOPx2 with D&C        AP course uncomplicated  GBS Positive as per patient  Fibroid present

## 2019-11-18 NOTE — CHART NOTE - NSCHARTNOTEFT_GEN_A_CORE
Patient evaluated bedside for labor progression. ISE placed 2/2 discontinuous tracing.    T(C): 36.8 (04:52)  HR: 99 (07:00)  BP: 115/64 (06:58)  RR: 16 (03:54)  SpO2: 100% (07:00)    SVE: 10/100/0  EFM: Likely CAT II FHT, areas of suspected variable decelerations however unable to determine 2/2 discontinuity, otherwise reassuring 125BPM, moderate variability, +accels  Drexel: irregular CTX    A/P: Patient is a 28yo  here for IOL for cat II FHT. WIll continue to monitor FHT/TOCO. Will continue to allow patient to labor down, however if tracing remains CAT II s/p ISE, will begin pushing.    Deyanira Russell, PGY2  D/W Dr. Gaviria

## 2019-11-18 NOTE — DISCHARGE NOTE OB - MEDICATION SUMMARY - MEDICATIONS TO STOP TAKING
I will STOP taking the medications listed below when I get home from the hospital:    Provera 10 mg oral tablet  -- 1 tab(s) by mouth once a day   -- Do not take this drug if you are pregnant.  It is very important that you take or use this exactly as directed.  Do not skip doses or discontinue unless directed by your doctor.  Take with food or milk.

## 2019-11-18 NOTE — OB PROVIDER DELIVERY SUMMARY - NSPROVIDERDELIVERYNOTE_OBGYN_ALL_OB_FT
pt became fd and pushed to deliver a liveborn male apgars 9/9 over 2nd degree perineal laceration   apgars 9/9  placenta delivered spontaneously with 3vc  laceration repaired with 2-0 chromic  ebl 300ml

## 2019-11-18 NOTE — OB RN DELIVERY SUMMARY - NS_CORDBLDBNKA_OBGYN_ALL_OB
"Anesthesia Post Evaluation    Patient: Mayra Crawford    Procedure(s) Performed: Procedure(s) (LRB):  ESOPHAGOGASTRODUODENOSCOPY (EGD) (N/A)    Final Anesthesia Type: general  Patient location during evaluation: PACU  Patient participation: Yes- Able to Participate  Level of consciousness: awake and alert  Post-procedure vital signs: reviewed and stable  Pain management: adequate  Airway patency: patent  PONV status at discharge: No PONV  Anesthetic complications: no      Cardiovascular status: blood pressure returned to baseline and hemodynamically stable  Respiratory status: unassisted, spontaneous ventilation and room air  Hydration status: euvolemic  Follow-up not needed.        Visit Vitals  BP (!) 141/60   Pulse 63   Temp 36.4 °C (97.5 °F) (Oral)   Resp 20   Ht 5' 5" (1.651 m)   Wt 81.6 kg (180 lb)   SpO2 95%   Breastfeeding? No   BMI 29.95 kg/m²       Pain/Antonella Score: Pain Assessment Performed: Yes (1/24/2018 11:12 AM)  Presence of Pain: denies (1/24/2018 11:12 AM)  Antonella Score: 10 (1/24/2018 11:12 AM)      " Yes

## 2019-11-18 NOTE — CHART NOTE - NSCHARTNOTEFT_GEN_A_CORE
S:    Patient examined due to recurrent variables. Reports feeling rectal pressure    O:   T(C): 36.8 (04:52)  HR: 93 (05:30)  BP: 124/73 (05:28)  RR: 16 (03:54)  SpO2: 95% (05:30)  SVE: 10/100/0  EFM: 120/ mod michaelle/ no acels/ intermittent variable decels  [place]: q5min        plan:  - will resuscitate  - will start pushing when patient feels urge    T Juanita PGY-4

## 2019-11-18 NOTE — OB RN DELIVERY SUMMARY - NS_SEPSISRSKCALC_OBGYN_ALL_OB_FT
EOS calculated successfully. EOS Risk Factor: 0.5/1000 live births ([facility] national incidence); GA=38w6d; Temp=98.24; ROM=4.817; GBS='Positive'; Antibiotics='GBS specific antibiotics > 2 hrs prior to birth'

## 2019-11-18 NOTE — DISCHARGE NOTE OB - CARE PLAN
Goal:	delivery  Assessment and plan of treatment:	f/u in 6 wk Principal Discharge DX:	Vaginal delivery  Goal:	delivery  Assessment and plan of treatment:	f/u in 6 wk

## 2019-11-18 NOTE — OB PROVIDER TRIAGE NOTE - HISTORY OF PRESENT ILLNESS
28y/o   @38.6wks presents with painful ctx since 9pm. Pain scale 7/10. Patient reports feeling them q5mins.  Reports good fetal movement   Denies LOF/VB    Allergies: Denies  Medications: PNV    Medical HX: Denies  Surgical HX: Vascular SX from ruptured abdomen vessel , D&C  Denies Psy/Etoh/Smoke/Drugs

## 2019-11-18 NOTE — OB PROVIDER TRIAGE NOTE - NSHPPHYSICALEXAM_GEN_ALL_CORE
Assessment reveals VSS  Abdomen soft, NT, gravid  VE: 1.5/60/-3    PLAN:  NST/CTX pattern Assessment reveals VSS  Abdomen soft, NT, gravid  VE: 1.5/60/-3    PLAN:  NST/CTX pattern    TAS: bpp8/8, vtx, GANESH:10.05, ant placenta Assessment reveals VSS  Abdomen soft, NT, gravid  VE: 1.5/60/-3    PLAN:  NST/CTX pattern    TAS: bpp8/8, vtx, GANESH:10.05, ant placenta  Cat 2 tracing, variables, irregular ctx

## 2019-11-18 NOTE — OB PROVIDER H&P - HISTORY OF PRESENT ILLNESS
30y/o   @38.6wks presents with painful ctx since 9pm. Pain scale 7/10. Patient reports feeling them q5mins.  Reports good fetal movement   Denies LOF/VB    Allergies: Denies  Medications: PNV    Medical HX: Denies  Surgical HX: Vascular SX from ruptured abdomen vessel , D&C  Denies Psy/Etoh/Smoke/Drugs

## 2019-11-18 NOTE — OB PROVIDER H&P - NSHPPHYSICALEXAM_GEN_ALL_CORE
Assessment reveals VSS  Abdomen soft, NT, gravid  VE: 1.5/60/-3    PLAN:  NST/CTX pattern    TAS: bpp8/8, vtx, GANESH:10.05, ant placenta  Cat 2 tracing, variables, irregular ctx

## 2019-11-18 NOTE — LACTATION INITIAL EVALUATION - LACTATION INTERVENTIONS
Infant less than 24 hours old. Mom educated with regard to 1) babies less than 24 hours of age will be sleepy. 2) Made aware of cluster feeding that occurs after 24 hours of life and to be cautious of sleep deprivation in order to maintain infant and mother safety. Instructed to place infant in bassinet or call for assistance if feeling sleepy or tired.  3)  Recognition of feeding cues and to feed the baby on demand based on cues at least 8-12 times in a day. Instructed pt. to wake the baby to feed if no feeding cues are seen within 3h since prior feed. 4) use  feeding log to record feedings along with wet and dirty diapers. Pt. informed of the accessibility of breastfeeding apps to document feedings along with wet and dirty diapers as another alternative for paper log. 5) instructed in hand expression with good return demonstration. Some colostrum noted. Pt. informed of interactive learning ilia available to use with the New Beginnings book. Interactive component demonstrated utilizing the section for hand expression. 6) Reviewed safe skin to skin. Shown wall poster for visual reinforcement of education. Verbalized understanding of education. Pt. informed of availability of lactation through the night and encouraged to call for assistance prn. RN made aware of plan and to assist with further feedings as necessary. Instructed to request assistance prn.

## 2019-11-18 NOTE — DISCHARGE NOTE OB - PATIENT PORTAL LINK FT
You can access the FollowMyHealth Patient Portal offered by [facility] by registering at the following website: [email or web address]. By joining [employer]’s FollowMyHealth portal, you will also be able to view your health information using other applications (apps) compatible with our system.

## 2019-11-18 NOTE — OB PROVIDER TRIAGE NOTE - NS_OBGYNHISTORY_OBGYN_ALL_OB_FT
GYN: Fibroid, no ctx          Ovarian cysts  OB: TOPx2 with D&C        AP course uncomplicated  GBS Positive as per patient GYN: Fibroid, no ctx          Ovarian cysts  OB: TOPx2 with D&C        AP course uncomplicated  GBS Positive as per patient  Fibroid present

## 2019-11-19 RX ORDER — BENZOCAINE AND MENTHOL 5; 1 G/100ML; G/100ML
1 LIQUID ORAL EVERY 4 HOURS
Refills: 0 | Status: DISCONTINUED | OUTPATIENT
Start: 2019-11-19 | End: 2019-11-20

## 2019-11-19 RX ORDER — BENZOCAINE AND MENTHOL 5; 1 G/100ML; G/100ML
1 LIQUID ORAL EVERY 4 HOURS
Refills: 0 | Status: DISCONTINUED | OUTPATIENT
Start: 2019-11-19 | End: 2019-11-19

## 2019-11-19 RX ADMIN — Medication 600 MILLIGRAM(S): at 22:07

## 2019-11-19 RX ADMIN — Medication 1 TABLET(S): at 11:35

## 2019-11-19 RX ADMIN — BENZOCAINE AND MENTHOL 1 LOZENGE: 5; 1 LIQUID ORAL at 22:07

## 2019-11-19 RX ADMIN — Medication 600 MILLIGRAM(S): at 06:31

## 2019-11-19 RX ADMIN — Medication 600 MILLIGRAM(S): at 11:35

## 2019-11-19 RX ADMIN — Medication 1 APPLICATION(S): at 20:29

## 2019-11-19 RX ADMIN — BENZOCAINE AND MENTHOL 1 LOZENGE: 5; 1 LIQUID ORAL at 18:40

## 2019-11-19 RX ADMIN — Medication 600 MILLIGRAM(S): at 05:21

## 2019-11-19 RX ADMIN — Medication 600 MILLIGRAM(S): at 22:43

## 2019-11-19 RX ADMIN — Medication 1 APPLICATION(S): at 20:30

## 2019-11-19 RX ADMIN — AER TRAVELER 1 APPLICATION(S): 0.5 SOLUTION RECTAL; TOPICAL at 20:29

## 2019-11-19 RX ADMIN — MAGNESIUM HYDROXIDE 30 MILLILITER(S): 400 TABLET, CHEWABLE ORAL at 20:29

## 2019-11-19 RX ADMIN — PRAMOXINE HYDROCHLORIDE 1 APPLICATION(S): 150 AEROSOL, FOAM RECTAL at 20:29

## 2019-11-19 RX ADMIN — Medication 600 MILLIGRAM(S): at 12:30

## 2019-11-19 NOTE — PROGRESS NOTE ADULT - SUBJECTIVE AND OBJECTIVE BOX
post epidural d/c f/u:    pt state freely voiding and ambulating.  Denies headache/nausea/vomiting.  States eating/drinking with no issues. States no concerns at this time.  VSS  ICU Vital Signs Last 24 Hrs  T(C): 36.9 (19 Nov 2019 06:00), Max: 36.9 (19 Nov 2019 06:00)  T(F): 98.4 (19 Nov 2019 06:00), Max: 98.4 (19 Nov 2019 06:00)  HR: 76 (19 Nov 2019 06:00) (76 - 88)  BP: 127/63 (19 Nov 2019 06:00) (113/70 - 127/63)  BP(mean): --  ABP: --  ABP(mean): --  RR: 19 (19 Nov 2019 06:00) (18 - 19)  SpO2: 100% (19 Nov 2019 06:00) (100% - 100%)

## 2019-11-19 NOTE — PROGRESS NOTE ADULT - SUBJECTIVE AND OBJECTIVE BOX
S: Patient doing well. Minimal lochia. Pain controlled. breastfeeding    O: Vital Signs Last 24 Hrs  T(C): 36.9 (2019 06:00), Max: 36.9 (2019 06:00)  T(F): 98.4 (2019 06:00), Max: 98.4 (2019 06:00)  HR: 76 (2019 06:00) (76 - 88)  BP: 127/63 (2019 06:00) (113/70 - 127/63)  BP(mean): --  RR: 19 (2019 06:00) (18 - 19)  SpO2: 100% (2019 06:00) (100% - 100%)    Gen: NAD  Abd: soft, NT, ND, fundus firm below umbilicus  Lochia: moderate  Ext: no tenderness    Labs:                        12.3   9.53  )-----------( 167      ( 2019 02:50 )             38.6       ABO Interpretation: AB ( @ 03:06)    Rh Interpretation: Positive ( @ 03:06)    Antibody Screen Negative      A: 29y PPD#1 s/p  doing well.     Plan: Continue routine postpartum care. Anticipate d/c home in am.

## 2019-11-20 VITALS
DIASTOLIC BLOOD PRESSURE: 61 MMHG | HEART RATE: 76 BPM | SYSTOLIC BLOOD PRESSURE: 112 MMHG | OXYGEN SATURATION: 97 % | RESPIRATION RATE: 16 BRPM | TEMPERATURE: 98 F

## 2019-11-20 RX ADMIN — Medication 1 TABLET(S): at 12:34

## 2019-11-20 RX ADMIN — Medication 600 MILLIGRAM(S): at 05:12

## 2019-11-20 RX ADMIN — Medication 600 MILLIGRAM(S): at 12:35

## 2019-11-20 RX ADMIN — Medication 975 MILLIGRAM(S): at 03:00

## 2019-11-20 RX ADMIN — BENZOCAINE AND MENTHOL 1 LOZENGE: 5; 1 LIQUID ORAL at 02:16

## 2019-11-20 RX ADMIN — Medication 600 MILLIGRAM(S): at 05:45

## 2019-11-20 RX ADMIN — Medication 600 MILLIGRAM(S): at 13:05

## 2019-11-20 RX ADMIN — Medication 975 MILLIGRAM(S): at 02:16

## 2020-04-26 ENCOUNTER — MESSAGE (OUTPATIENT)
Age: 30
End: 2020-04-26

## 2020-05-15 ENCOUNTER — APPOINTMENT (OUTPATIENT)
Dept: DISASTER EMERGENCY | Facility: CLINIC | Age: 30
End: 2020-05-15

## 2020-05-16 LAB
SARS-COV-2 IGG SERPL IA-ACNC: 4.7 INDEX
SARS-COV-2 IGG SERPL QL IA: POSITIVE

## 2021-02-09 NOTE — ED ADULT TRIAGE NOTE - ACCOMPANIED BY
Self Detail Level: Detailed Patient Specific Counseling (Will Not Stick From Patient To Patient): A potential space was created after expressing myxoid material. Pt instructed to keep this area bandaged and meticulously clean.

## 2021-03-30 NOTE — ED ADULT NURSE NOTE - NS ED NURSE LEVEL OF CONSCIOUSNESS ORIENTATION
Detail Level: Simple Detail Level: Zone Detail Level: Generalized Oriented - self; Oriented - place; Oriented - time

## 2021-06-30 NOTE — ED ADULT NURSE NOTE - PAIN RATING/NUMBER SCALE (0-10): REST
[FreeTextEntry1] : The patient is referred for cardiology consultation because of fatigue some shortness of breath, some chest discomfort.  The patient has had a long history of treatment for cancer.  She is thought to be cured of her primary malignancy but many years later developed thyroid cancer and underwent thyroidectomy.  Ever since thyroidectomy the patient has felt an extreme sense of fatigue.  At times she is able to exercise but other times she is just too tired to exercise.  When she is able to exercise she can exercise for 30 to 45 minutes without exertional chest discomfort.  There is some shortness of breath with heavy exertion which he feels is normal for her.  There has been no change recently.  When arising from supine to standing she feels lightheaded.  There has been no syncope.  There has been no near syncope.  The patient has undergone chemotherapy and radiation therapy.  The patient has polycystic ovarian disease and is treated with spironolactone.  She states that this helped her acne tremendously.
0

## 2021-07-19 ENCOUNTER — TRANSCRIPTION ENCOUNTER (OUTPATIENT)
Age: 31
End: 2021-07-19

## 2021-10-13 ENCOUNTER — RESULT REVIEW (OUTPATIENT)
Age: 31
End: 2021-10-13

## 2021-10-26 ENCOUNTER — APPOINTMENT (OUTPATIENT)
Dept: ULTRASOUND IMAGING | Facility: HOSPITAL | Age: 31
End: 2021-10-26
Payer: COMMERCIAL

## 2021-10-26 ENCOUNTER — OUTPATIENT (OUTPATIENT)
Dept: OUTPATIENT SERVICES | Facility: HOSPITAL | Age: 31
LOS: 1 days | End: 2021-10-26
Payer: COMMERCIAL

## 2021-10-26 DIAGNOSIS — R10.2 PELVIC AND PERINEAL PAIN: ICD-10-CM

## 2021-10-26 DIAGNOSIS — Z98.890 OTHER SPECIFIED POSTPROCEDURAL STATES: Chronic | ICD-10-CM

## 2021-10-26 DIAGNOSIS — D25.2 SUBSEROSAL LEIOMYOMA OF UTERUS: ICD-10-CM

## 2021-10-26 DIAGNOSIS — Z98.89 OTHER SPECIFIED POSTPROCEDURAL STATES: Chronic | ICD-10-CM

## 2021-10-26 PROCEDURE — 76830 TRANSVAGINAL US NON-OB: CPT | Mod: 26

## 2021-10-26 PROCEDURE — 76856 US EXAM PELVIC COMPLETE: CPT | Mod: 26

## 2021-10-26 PROCEDURE — 76830 TRANSVAGINAL US NON-OB: CPT

## 2021-10-26 PROCEDURE — 76856 US EXAM PELVIC COMPLETE: CPT

## 2021-12-24 ENCOUNTER — EMERGENCY (EMERGENCY)
Facility: HOSPITAL | Age: 31
LOS: 1 days | Discharge: HOME | End: 2021-12-24
Attending: EMERGENCY MEDICINE
Payer: COMMERCIAL

## 2021-12-24 VITALS
HEART RATE: 62 BPM | DIASTOLIC BLOOD PRESSURE: 76 MMHG | TEMPERATURE: 98 F | RESPIRATION RATE: 18 BRPM | OXYGEN SATURATION: 100 % | SYSTOLIC BLOOD PRESSURE: 112 MMHG

## 2021-12-24 VITALS
OXYGEN SATURATION: 98 % | TEMPERATURE: 99 F | HEIGHT: 67 IN | RESPIRATION RATE: 16 BRPM | DIASTOLIC BLOOD PRESSURE: 81 MMHG | SYSTOLIC BLOOD PRESSURE: 121 MMHG | HEART RATE: 68 BPM | WEIGHT: 179.9 LBS

## 2021-12-24 DIAGNOSIS — Z98.890 OTHER SPECIFIED POSTPROCEDURAL STATES: Chronic | ICD-10-CM

## 2021-12-24 DIAGNOSIS — Z98.89 OTHER SPECIFIED POSTPROCEDURAL STATES: Chronic | ICD-10-CM

## 2021-12-24 LAB
ALBUMIN SERPL ELPH-MCNC: 3.6 G/DL — SIGNIFICANT CHANGE UP (ref 3.5–5)
ALP SERPL-CCNC: 95 U/L — SIGNIFICANT CHANGE UP (ref 40–120)
ALT FLD-CCNC: 39 U/L DA — SIGNIFICANT CHANGE UP (ref 10–60)
ANION GAP SERPL CALC-SCNC: 8 MMOL/L — SIGNIFICANT CHANGE UP (ref 5–17)
APPEARANCE UR: CLEAR — SIGNIFICANT CHANGE UP
AST SERPL-CCNC: 24 U/L — SIGNIFICANT CHANGE UP (ref 10–40)
BASOPHILS # BLD AUTO: 0.02 K/UL — SIGNIFICANT CHANGE UP (ref 0–0.2)
BASOPHILS NFR BLD AUTO: 0.4 % — SIGNIFICANT CHANGE UP (ref 0–2)
BILIRUB SERPL-MCNC: 0.4 MG/DL — SIGNIFICANT CHANGE UP (ref 0.2–1.2)
BILIRUB UR-MCNC: NEGATIVE — SIGNIFICANT CHANGE UP
BUN SERPL-MCNC: 10 MG/DL — SIGNIFICANT CHANGE UP (ref 7–18)
CALCIUM SERPL-MCNC: 8.5 MG/DL — SIGNIFICANT CHANGE UP (ref 8.4–10.5)
CHLORIDE SERPL-SCNC: 107 MMOL/L — SIGNIFICANT CHANGE UP (ref 96–108)
CO2 SERPL-SCNC: 25 MMOL/L — SIGNIFICANT CHANGE UP (ref 22–31)
COLOR SPEC: YELLOW — SIGNIFICANT CHANGE UP
CREAT SERPL-MCNC: 0.76 MG/DL — SIGNIFICANT CHANGE UP (ref 0.5–1.3)
DIFF PNL FLD: NEGATIVE — SIGNIFICANT CHANGE UP
EOSINOPHIL # BLD AUTO: 0.11 K/UL — SIGNIFICANT CHANGE UP (ref 0–0.5)
EOSINOPHIL NFR BLD AUTO: 2.2 % — SIGNIFICANT CHANGE UP (ref 0–6)
GLUCOSE SERPL-MCNC: 95 MG/DL — SIGNIFICANT CHANGE UP (ref 70–99)
GLUCOSE UR QL: NEGATIVE — SIGNIFICANT CHANGE UP
HCG SERPL-ACNC: <1 MIU/ML — SIGNIFICANT CHANGE UP
HCG UR QL: NEGATIVE — SIGNIFICANT CHANGE UP
HCT VFR BLD CALC: 40.4 % — SIGNIFICANT CHANGE UP (ref 34.5–45)
HGB BLD-MCNC: 12.7 G/DL — SIGNIFICANT CHANGE UP (ref 11.5–15.5)
IMM GRANULOCYTES NFR BLD AUTO: 0.2 % — SIGNIFICANT CHANGE UP (ref 0–1.5)
KETONES UR-MCNC: NEGATIVE — SIGNIFICANT CHANGE UP
LEUKOCYTE ESTERASE UR-ACNC: ABNORMAL
LIDOCAIN IGE QN: 86 U/L — SIGNIFICANT CHANGE UP (ref 73–393)
LYMPHOCYTES # BLD AUTO: 2.21 K/UL — SIGNIFICANT CHANGE UP (ref 1–3.3)
LYMPHOCYTES # BLD AUTO: 44.1 % — HIGH (ref 13–44)
MCHC RBC-ENTMCNC: 29.5 PG — SIGNIFICANT CHANGE UP (ref 27–34)
MCHC RBC-ENTMCNC: 31.4 GM/DL — LOW (ref 32–36)
MCV RBC AUTO: 94 FL — SIGNIFICANT CHANGE UP (ref 80–100)
MONOCYTES # BLD AUTO: 0.4 K/UL — SIGNIFICANT CHANGE UP (ref 0–0.9)
MONOCYTES NFR BLD AUTO: 8 % — SIGNIFICANT CHANGE UP (ref 2–14)
NEUTROPHILS # BLD AUTO: 2.26 K/UL — SIGNIFICANT CHANGE UP (ref 1.8–7.4)
NEUTROPHILS NFR BLD AUTO: 45.1 % — SIGNIFICANT CHANGE UP (ref 43–77)
NITRITE UR-MCNC: NEGATIVE — SIGNIFICANT CHANGE UP
NRBC # BLD: 0 /100 WBCS — SIGNIFICANT CHANGE UP (ref 0–0)
PH UR: 5 — SIGNIFICANT CHANGE UP (ref 5–8)
PLATELET # BLD AUTO: 202 K/UL — SIGNIFICANT CHANGE UP (ref 150–400)
POTASSIUM SERPL-MCNC: 4 MMOL/L — SIGNIFICANT CHANGE UP (ref 3.5–5.3)
POTASSIUM SERPL-SCNC: 4 MMOL/L — SIGNIFICANT CHANGE UP (ref 3.5–5.3)
PROT SERPL-MCNC: 8.2 G/DL — SIGNIFICANT CHANGE UP (ref 6–8.3)
PROT UR-MCNC: 15
RBC # BLD: 4.3 M/UL — SIGNIFICANT CHANGE UP (ref 3.8–5.2)
RBC # FLD: 13.2 % — SIGNIFICANT CHANGE UP (ref 10.3–14.5)
SARS-COV-2 RNA SPEC QL NAA+PROBE: SIGNIFICANT CHANGE UP
SODIUM SERPL-SCNC: 140 MMOL/L — SIGNIFICANT CHANGE UP (ref 135–145)
SP GR SPEC: 1.02 — SIGNIFICANT CHANGE UP (ref 1.01–1.02)
UROBILINOGEN FLD QL: NEGATIVE — SIGNIFICANT CHANGE UP
WBC # BLD: 5.01 K/UL — SIGNIFICANT CHANGE UP (ref 3.8–10.5)
WBC # FLD AUTO: 5.01 K/UL — SIGNIFICANT CHANGE UP (ref 3.8–10.5)

## 2021-12-24 PROCEDURE — 84702 CHORIONIC GONADOTROPIN TEST: CPT

## 2021-12-24 PROCEDURE — 87635 SARS-COV-2 COVID-19 AMP PRB: CPT

## 2021-12-24 PROCEDURE — 99053 MED SERV 10PM-8AM 24 HR FAC: CPT

## 2021-12-24 PROCEDURE — 36415 COLL VENOUS BLD VENIPUNCTURE: CPT

## 2021-12-24 PROCEDURE — 99285 EMERGENCY DEPT VISIT HI MDM: CPT

## 2021-12-24 PROCEDURE — 81001 URINALYSIS AUTO W/SCOPE: CPT

## 2021-12-24 PROCEDURE — 80053 COMPREHEN METABOLIC PANEL: CPT

## 2021-12-24 PROCEDURE — 93010 ELECTROCARDIOGRAM REPORT: CPT

## 2021-12-24 PROCEDURE — 99283 EMERGENCY DEPT VISIT LOW MDM: CPT

## 2021-12-24 PROCEDURE — 93005 ELECTROCARDIOGRAM TRACING: CPT

## 2021-12-24 PROCEDURE — 85025 COMPLETE CBC W/AUTO DIFF WBC: CPT

## 2021-12-24 PROCEDURE — 83690 ASSAY OF LIPASE: CPT

## 2021-12-24 PROCEDURE — 81025 URINE PREGNANCY TEST: CPT

## 2021-12-24 PROCEDURE — 82962 GLUCOSE BLOOD TEST: CPT

## 2021-12-24 RX ORDER — MECLIZINE HCL 12.5 MG
1 TABLET ORAL
Qty: 12 | Refills: 0
Start: 2021-12-24

## 2021-12-24 RX ORDER — SODIUM CHLORIDE 9 MG/ML
1000 INJECTION INTRAMUSCULAR; INTRAVENOUS; SUBCUTANEOUS ONCE
Refills: 0 | Status: COMPLETED | OUTPATIENT
Start: 2021-12-24 | End: 2021-12-24

## 2021-12-24 RX ADMIN — SODIUM CHLORIDE 1000 MILLILITER(S): 9 INJECTION INTRAMUSCULAR; INTRAVENOUS; SUBCUTANEOUS at 08:28

## 2021-12-24 NOTE — ED PROVIDER NOTE - NSFOLLOWUPCLINICS_GEN_ALL_ED_FT
[facility]  Internal Medicine  [address] [place] [address]  Phone: [phone]  Fax: [phone]

## 2021-12-24 NOTE — ED PROVIDER NOTE - OBJECTIVE STATEMENT
Pt with intermittent dizziness, described as room spinning sensation. Intermittent since yesterday. Last episode at 4am lasting few minutes. (Pt works as overnight ED registrar). Pt states had hx of vertigo an teenager.  No , no shortness of breath, no headache, no vision changes, no N/V.  On evaluation pt is asymptomatic and feels well.

## 2021-12-24 NOTE — ED PROVIDER NOTE - NSICDXPASTMEDICALHX_GEN_ALL_CORE_FT
PAST MEDICAL HISTORY:  Fibroids uterine    History of termination of pregnancy x2    Ovarian cyst

## 2021-12-24 NOTE — ED PROVIDER NOTE - NSFOLLOWUPINSTRUCTIONS_ED_ALL_ED_FT
Vertigo is a condition that causes you to feel dizzy. You may feel that you or everything around you is moving or spinning. You may also feel like you are being pulled down or toward your side.     DISCHARGE INSTRUCTIONS:    Seek care immediately if:   •You have a headache and a stiff neck.      •You have shaking chills and a fever.       •You vomit over and over with no relief.       •You have blood, pus, or fluid coming out of your ears.      •You are confused.       Contact your healthcare provider if:   •Your symptoms do not get better with treatment.       •You have questions about your condition or care.      Medicines:   •Medicine may be given to help relieve your symptoms.      •Take your medicine as directed. Contact your healthcare provider if you think your medicine is not helping or if you have side effects. Tell him or her if you are allergic to any medicine. Keep a list of the medicines, vitamins, and herbs you take. Include the amounts, and when and why you take them. Bring the list or the pill bottles to follow-up visits. Carry your medicine list with you in case of an emergency.      Manage your symptoms:   •Do not drive, walk without help, or operate heavy machinery when you are dizzy.       •Move slowly when you move from one position to another position. Get up slowly from sitting or lying down. Sit or lie down right away if you feel dizzy.      •Drink plenty of liquids. Liquids help prevent dehydration. Ask how much liquid to drink each day and which liquids are best for you.      •Vestibular and balance rehabilitation therapy (VBRT) is used to teach you exercises to improve your balance and strength. These exercises may help decrease your vertigo and improve your balance. Ask for more information about this therapy.      Follow up with your doctor as directed: Write down your questions so you remember to ask them during your visits.        © Copyright [employer] 2021

## 2021-12-24 NOTE — ED PROVIDER NOTE - NSICDXPASTSURGICALHX_GEN_ALL_CORE_FT
PAST SURGICAL HISTORY:  H/O vascular surgery 2011 Pt states blood vessel ruptured in abdomen when she was 20    History of dilation and curettage Dec.5th 2018 due to fibroid

## 2021-12-24 NOTE — ED PROVIDER NOTE - CLINICAL SUMMARY MEDICAL DECISION MAKING FREE TEXT BOX
Pt currently asymptomatic, will f/u diagnostics an monitor. Pt currently asymptomatic, will f/u diagnostics an monitor.  958a- pt remains well appearing, has no urinary symptoms. Denies current dizziness, no focal deficits. Pt is well appearing, has no new complaints and able to walk with normal gait. Pt is stable for discharge and follow up with medical doctor. Pt educated on care and need for follow up. Discussed anticipatory guidance and return precautions. Questions answered. I had a detailed discussion with the patient and/or guardian regarding the historical points, exam findings, and any diagnostic results supporting the discharge diagnosis.

## 2021-12-24 NOTE — ED PROVIDER NOTE - PHYSICAL EXAMINATION
No nystagmus, no ataxia.  Kernig and Brudzinski signs area negative, no petechiae, no photophobia, no dysarthria, no facial asymmetry, no focal deficits.   NIH stroke scale is zero. Pt passed dysphagia screen.

## 2021-12-24 NOTE — ED PROVIDER NOTE - PATIENT PORTAL LINK FT
You can access the FollowMyHealth Patient Portal offered by [facility] by registering at the following website: [email or web address]. By joining [employer]’s FollowMyHealth portal, you will also be able to view your health information using other applications (apps) compatible with our system.

## 2021-12-30 ENCOUNTER — OUTPATIENT (OUTPATIENT)
Dept: OUTPATIENT SERVICES | Facility: HOSPITAL | Age: 31
LOS: 1 days | End: 2021-12-30
Payer: COMMERCIAL

## 2021-12-30 DIAGNOSIS — Z98.89 OTHER SPECIFIED POSTPROCEDURAL STATES: Chronic | ICD-10-CM

## 2021-12-30 DIAGNOSIS — Z98.890 OTHER SPECIFIED POSTPROCEDURAL STATES: Chronic | ICD-10-CM

## 2021-12-30 DIAGNOSIS — B34.9 VIRAL INFECTION, UNSPECIFIED: ICD-10-CM

## 2021-12-30 LAB
RAPID RVP RESULT: DETECTED
SARS-COV-2 RNA SPEC QL NAA+PROBE: DETECTED

## 2021-12-30 PROCEDURE — 0225U NFCT DS DNA&RNA 21 SARSCOV2: CPT

## 2022-07-14 ENCOUNTER — APPOINTMENT (OUTPATIENT)
Dept: ULTRASOUND IMAGING | Facility: HOSPITAL | Age: 32
End: 2022-07-14

## 2022-07-14 ENCOUNTER — OUTPATIENT (OUTPATIENT)
Dept: OUTPATIENT SERVICES | Facility: HOSPITAL | Age: 32
LOS: 1 days | End: 2022-07-14
Payer: COMMERCIAL

## 2022-07-14 DIAGNOSIS — D25.2 SUBSEROSAL LEIOMYOMA OF UTERUS: ICD-10-CM

## 2022-07-14 DIAGNOSIS — N83.292 OTHER OVARIAN CYST, LEFT SIDE: ICD-10-CM

## 2022-07-14 DIAGNOSIS — R10.32 LEFT LOWER QUADRANT PAIN: ICD-10-CM

## 2022-07-14 DIAGNOSIS — Z98.890 OTHER SPECIFIED POSTPROCEDURAL STATES: Chronic | ICD-10-CM

## 2022-07-14 DIAGNOSIS — Z98.89 OTHER SPECIFIED POSTPROCEDURAL STATES: Chronic | ICD-10-CM

## 2022-07-14 PROCEDURE — 76830 TRANSVAGINAL US NON-OB: CPT

## 2022-07-14 PROCEDURE — 76856 US EXAM PELVIC COMPLETE: CPT

## 2022-07-14 PROCEDURE — 76856 US EXAM PELVIC COMPLETE: CPT | Mod: 26

## 2022-07-14 PROCEDURE — 76830 TRANSVAGINAL US NON-OB: CPT | Mod: 26

## 2022-08-09 ENCOUNTER — EMERGENCY (EMERGENCY)
Facility: HOSPITAL | Age: 32
LOS: 1 days | Discharge: HOME | End: 2022-08-09
Attending: EMERGENCY MEDICINE
Payer: COMMERCIAL

## 2022-08-09 VITALS
HEART RATE: 85 BPM | OXYGEN SATURATION: 98 % | RESPIRATION RATE: 17 BRPM | HEIGHT: 67 IN | SYSTOLIC BLOOD PRESSURE: 105 MMHG | TEMPERATURE: 99 F | WEIGHT: 160.06 LBS | DIASTOLIC BLOOD PRESSURE: 75 MMHG

## 2022-08-09 DIAGNOSIS — Z98.890 OTHER SPECIFIED POSTPROCEDURAL STATES: Chronic | ICD-10-CM

## 2022-08-09 DIAGNOSIS — Z98.89 OTHER SPECIFIED POSTPROCEDURAL STATES: Chronic | ICD-10-CM

## 2022-08-09 LAB
ALBUMIN SERPL ELPH-MCNC: 3.4 G/DL — LOW (ref 3.5–5)
ALP SERPL-CCNC: 89 U/L — SIGNIFICANT CHANGE UP (ref 40–120)
ALT FLD-CCNC: 25 U/L DA — SIGNIFICANT CHANGE UP (ref 10–60)
ANION GAP SERPL CALC-SCNC: 6 MMOL/L — SIGNIFICANT CHANGE UP (ref 5–17)
APPEARANCE UR: CLEAR — SIGNIFICANT CHANGE UP
AST SERPL-CCNC: 16 U/L — SIGNIFICANT CHANGE UP (ref 10–40)
BACTERIA # UR AUTO: ABNORMAL /HPF
BASOPHILS # BLD AUTO: 0.03 K/UL — SIGNIFICANT CHANGE UP (ref 0–0.2)
BASOPHILS NFR BLD AUTO: 0.6 % — SIGNIFICANT CHANGE UP (ref 0–2)
BILIRUB SERPL-MCNC: 0.4 MG/DL — SIGNIFICANT CHANGE UP (ref 0.2–1.2)
BILIRUB UR-MCNC: NEGATIVE — SIGNIFICANT CHANGE UP
BUN SERPL-MCNC: 9 MG/DL — SIGNIFICANT CHANGE UP (ref 7–18)
CALCIUM SERPL-MCNC: 8.9 MG/DL — SIGNIFICANT CHANGE UP (ref 8.4–10.5)
CHLORIDE SERPL-SCNC: 107 MMOL/L — SIGNIFICANT CHANGE UP (ref 96–108)
CO2 SERPL-SCNC: 28 MMOL/L — SIGNIFICANT CHANGE UP (ref 22–31)
COLOR SPEC: YELLOW — SIGNIFICANT CHANGE UP
CREAT SERPL-MCNC: 0.87 MG/DL — SIGNIFICANT CHANGE UP (ref 0.5–1.3)
DIFF PNL FLD: NEGATIVE — SIGNIFICANT CHANGE UP
EGFR: 90 ML/MIN/1.73M2 — SIGNIFICANT CHANGE UP
EOSINOPHIL # BLD AUTO: 0.08 K/UL — SIGNIFICANT CHANGE UP (ref 0–0.5)
EOSINOPHIL NFR BLD AUTO: 1.6 % — SIGNIFICANT CHANGE UP (ref 0–6)
EPI CELLS # UR: ABNORMAL /HPF
GLUCOSE SERPL-MCNC: 82 MG/DL — SIGNIFICANT CHANGE UP (ref 70–99)
GLUCOSE UR QL: NEGATIVE — SIGNIFICANT CHANGE UP
HCG SERPL-ACNC: <1 MIU/ML — SIGNIFICANT CHANGE UP
HCG UR QL: NEGATIVE — SIGNIFICANT CHANGE UP
HCT VFR BLD CALC: 40.6 % — SIGNIFICANT CHANGE UP (ref 34.5–45)
HGB BLD-MCNC: 13 G/DL — SIGNIFICANT CHANGE UP (ref 11.5–15.5)
IMM GRANULOCYTES NFR BLD AUTO: 0.2 % — SIGNIFICANT CHANGE UP (ref 0–1.5)
KETONES UR-MCNC: NEGATIVE — SIGNIFICANT CHANGE UP
LEUKOCYTE ESTERASE UR-ACNC: ABNORMAL
LYMPHOCYTES # BLD AUTO: 2.04 K/UL — SIGNIFICANT CHANGE UP (ref 1–3.3)
LYMPHOCYTES # BLD AUTO: 40.6 % — SIGNIFICANT CHANGE UP (ref 13–44)
MAGNESIUM SERPL-MCNC: 2.2 MG/DL — SIGNIFICANT CHANGE UP (ref 1.6–2.6)
MCHC RBC-ENTMCNC: 30.4 PG — SIGNIFICANT CHANGE UP (ref 27–34)
MCHC RBC-ENTMCNC: 32 GM/DL — SIGNIFICANT CHANGE UP (ref 32–36)
MCV RBC AUTO: 95.1 FL — SIGNIFICANT CHANGE UP (ref 80–100)
MONOCYTES # BLD AUTO: 0.49 K/UL — SIGNIFICANT CHANGE UP (ref 0–0.9)
MONOCYTES NFR BLD AUTO: 9.7 % — SIGNIFICANT CHANGE UP (ref 2–14)
NEUTROPHILS # BLD AUTO: 2.38 K/UL — SIGNIFICANT CHANGE UP (ref 1.8–7.4)
NEUTROPHILS NFR BLD AUTO: 47.3 % — SIGNIFICANT CHANGE UP (ref 43–77)
NITRITE UR-MCNC: NEGATIVE — SIGNIFICANT CHANGE UP
NRBC # BLD: 0 /100 WBCS — SIGNIFICANT CHANGE UP (ref 0–0)
PH UR: 6.5 — SIGNIFICANT CHANGE UP (ref 5–8)
PLATELET # BLD AUTO: 200 K/UL — SIGNIFICANT CHANGE UP (ref 150–400)
POTASSIUM SERPL-MCNC: 3.7 MMOL/L — SIGNIFICANT CHANGE UP (ref 3.5–5.3)
POTASSIUM SERPL-SCNC: 3.7 MMOL/L — SIGNIFICANT CHANGE UP (ref 3.5–5.3)
PROT SERPL-MCNC: 7.8 G/DL — SIGNIFICANT CHANGE UP (ref 6–8.3)
PROT UR-MCNC: 15
RBC # BLD: 4.27 M/UL — SIGNIFICANT CHANGE UP (ref 3.8–5.2)
RBC # FLD: 13.4 % — SIGNIFICANT CHANGE UP (ref 10.3–14.5)
RBC CASTS # UR COMP ASSIST: SIGNIFICANT CHANGE UP /HPF (ref 0–2)
SODIUM SERPL-SCNC: 141 MMOL/L — SIGNIFICANT CHANGE UP (ref 135–145)
SP GR SPEC: 1.01 — SIGNIFICANT CHANGE UP (ref 1.01–1.02)
T4 AB SER-ACNC: 8 UG/DL — SIGNIFICANT CHANGE UP (ref 4.6–12)
TROPONIN I, HIGH SENSITIVITY RESULT: <3 NG/L — SIGNIFICANT CHANGE UP
TSH SERPL-MCNC: 1.15 UU/ML — SIGNIFICANT CHANGE UP (ref 0.34–4.82)
UROBILINOGEN FLD QL: 1
WBC # BLD: 5.03 K/UL — SIGNIFICANT CHANGE UP (ref 3.8–10.5)
WBC # FLD AUTO: 5.03 K/UL — SIGNIFICANT CHANGE UP (ref 3.8–10.5)
WBC UR QL: ABNORMAL /HPF (ref 0–5)

## 2022-08-09 PROCEDURE — 85025 COMPLETE CBC W/AUTO DIFF WBC: CPT

## 2022-08-09 PROCEDURE — 36415 COLL VENOUS BLD VENIPUNCTURE: CPT

## 2022-08-09 PROCEDURE — 82962 GLUCOSE BLOOD TEST: CPT

## 2022-08-09 PROCEDURE — 84484 ASSAY OF TROPONIN QUANT: CPT

## 2022-08-09 PROCEDURE — 81025 URINE PREGNANCY TEST: CPT

## 2022-08-09 PROCEDURE — 84436 ASSAY OF TOTAL THYROXINE: CPT

## 2022-08-09 PROCEDURE — 84702 CHORIONIC GONADOTROPIN TEST: CPT

## 2022-08-09 PROCEDURE — 83735 ASSAY OF MAGNESIUM: CPT

## 2022-08-09 PROCEDURE — 99285 EMERGENCY DEPT VISIT HI MDM: CPT

## 2022-08-09 PROCEDURE — 81001 URINALYSIS AUTO W/SCOPE: CPT

## 2022-08-09 PROCEDURE — 84443 ASSAY THYROID STIM HORMONE: CPT

## 2022-08-09 PROCEDURE — 99283 EMERGENCY DEPT VISIT LOW MDM: CPT | Mod: 25

## 2022-08-09 PROCEDURE — 93005 ELECTROCARDIOGRAM TRACING: CPT

## 2022-08-09 PROCEDURE — 80053 COMPREHEN METABOLIC PANEL: CPT

## 2022-08-09 RX ORDER — CEFUROXIME AXETIL 250 MG
1 TABLET ORAL
Qty: 10 | Refills: 0
Start: 2022-08-09 | End: 2022-08-13

## 2022-08-09 RX ORDER — SODIUM CHLORIDE 9 MG/ML
1000 INJECTION INTRAMUSCULAR; INTRAVENOUS; SUBCUTANEOUS ONCE
Refills: 0 | Status: COMPLETED | OUTPATIENT
Start: 2022-08-09 | End: 2022-08-09

## 2022-08-09 RX ADMIN — SODIUM CHLORIDE 1000 MILLILITER(S): 9 INJECTION INTRAMUSCULAR; INTRAVENOUS; SUBCUTANEOUS at 17:36

## 2022-08-09 NOTE — ED PROVIDER NOTE - PATIENT PORTAL LINK FT
You can access the FollowMyHealth Patient Portal offered by [facility] by registering at the following website: [email or web address]. By joining [employer]’s FollowMyHealth portal, you will also be able to view your health information using other applications (apps) compatible with our system.

## 2022-08-09 NOTE — ED PROVIDER NOTE - NSFOLLOWUPINSTRUCTIONS_ED_ALL_ED_FT
Follow up with the cardiologist within 1 week.  Follow up with the primary care doctor within 2-3 days.  If you experience any new or worsening symptoms or if you are concerned you can always come back to the emergency for a re-evaluation.  If there were any prescriptions given to you during the visit today take them as prescribed. If you have any questions you can ask the pharmacist.

## 2022-08-09 NOTE — ED PROVIDER NOTE - SPECIALTY CARE
75 Cardiology

## 2022-08-09 NOTE — ED PROVIDER NOTE - NS ED ATTENDING STATEMENT MOD
This was a shared visit with the EL. I reviewed and verified the documentation and independently performed the documented:

## 2022-08-09 NOTE — ED ADULT NURSE NOTE - OBJECTIVE STATEMENT
States she felt dizzy while driving today then felt OK. While at a meeting at work she felt her heart beating fast.

## 2022-08-09 NOTE — ED PROVIDER NOTE - PROGRESS NOTE DETAILS
Feeling better. Labs grossly unremarkable. UA shows mild UTI. Will treat with Ceftin. Patient reports that in the past had similar symptoms. Will refer to cardio for follow up possible holter/echo. Pt is well appearing walking with steady gait, stable for discharge and follow up without fail with medical doctor. I had a detailed discussion with the patient and/or guardian regarding the historical points, exam findings, and any diagnostic results supporting the discharge diagnosis. Pt educated on care and need for follow up. Strict return instructions and red flag signs and symptoms discussed with patient. Questions answered. Pt shows understanding of discharge information and agrees to follow.

## 2022-08-09 NOTE — ED PROVIDER NOTE - CLINICAL SUMMARY MEDICAL DECISION MAKING FREE TEXT BOX
32 year-old female, presents with episode of dizziness and palpitations today. Currently well-appearing, asymptomatic, vital signs within normal limits, afebrile. ECG NSR. PERC negative. Presentation of low suspicion for vertigo, CVA, HF, ACS. PLan: labs, urine, IVF, re-assess.

## 2022-08-09 NOTE — ED PROVIDER NOTE - ATTENDING APP SHARED VISIT CONTRIBUTION OF CARE
32yoF with h/o fibroids, presents with syncope. States yesterday she felt dizzy while walking and passed out for a few seconds into 's arms, no trauma. Today dizzy while driving to work and palpitations at work. Past syncope as child with no extensive cardio work up. Denies all other symptoms including CP, SOB, bleeding, v/d, urinary symptoms, fever, leg pain or swelling, recent long distance travel. On exam, afebrile, hemodynamically stable, saturating well, NAD, well appearing, laying comfortably in bed, no WOB, speaking full sentences, head NCAT, EOMI grossly, anicteric, MMM, no JVD, RRR, nml S1/S2, no m/r/g, lungs CTAB, no w/r/r, abd soft, NT, ND, nml BS, no rebound or guarding, AAO, CN's 3-12 grossly intact, CARNEY spontaneously, no leg cyanosis or edema, skin warm, well perfused, no rashes or hives. Character low suspicion for CVA and no focal or localizing findings. No arrhythmia on ECG or exam or bedside monitoring (including while pt had symptoms) or e/o aortic outflow obstruction. No anemia or bleeding or gross electrolyte abnormalities. No CP/SOB to suggest ACS or e/o DVT to suggest PE. No fever or specific infectious symptoms, though noted concern for UTI and will treat. Patient is well appearing, NAD, afebrile, hemodynamically stable, walking without issues. Any available tests and studies were discussed with patient. Discharged with instructions in further symptomatic care, return precautions, and need for cardio f/u.

## 2022-08-09 NOTE — ED PROVIDER NOTE - CARE PROVIDER_API CALL
Eric Diallo)  Cardiology  [address] [place] [address]  Phone: [phone]  Fax: [phone]  Follow Up Time:

## 2022-08-09 NOTE — ED PROVIDER NOTE - CARE PLAN
Principal Discharge DX:	Dizziness  Secondary Diagnosis:	Palpitations  Secondary Diagnosis:	Acute UTI   1

## 2022-08-10 RX ORDER — FLUCONAZOLE 150 MG/1
1 TABLET ORAL
Qty: 2 | Refills: 0
Start: 2022-08-10

## 2022-08-10 NOTE — ED POST DISCHARGE NOTE - OTHER COMMUNICATION
8/10 Patient called. Asked for rx for diflucan as she always gets yeast vaginitis when taking antibiotics

## 2022-08-16 ENCOUNTER — APPOINTMENT (OUTPATIENT)
Dept: HEART AND VASCULAR | Facility: CLINIC | Age: 32
End: 2022-08-16

## 2022-08-16 ENCOUNTER — NON-APPOINTMENT (OUTPATIENT)
Age: 32
End: 2022-08-16

## 2022-08-16 VITALS
TEMPERATURE: 97.5 F | DIASTOLIC BLOOD PRESSURE: 70 MMHG | HEIGHT: 67.5 IN | BODY MASS INDEX: 28.7 KG/M2 | SYSTOLIC BLOOD PRESSURE: 108 MMHG | WEIGHT: 185 LBS | HEART RATE: 83 BPM | OXYGEN SATURATION: 100 %

## 2022-08-16 DIAGNOSIS — Z80.1 FAMILY HISTORY OF MALIGNANT NEOPLASM OF TRACHEA, BRONCHUS AND LUNG: ICD-10-CM

## 2022-08-16 DIAGNOSIS — Z78.9 OTHER SPECIFIED HEALTH STATUS: ICD-10-CM

## 2022-08-16 DIAGNOSIS — Z82.49 FAMILY HISTORY OF ISCHEMIC HEART DISEASE AND OTHER DISEASES OF THE CIRCULATORY SYSTEM: ICD-10-CM

## 2022-08-16 DIAGNOSIS — Z83.2 FAMILY HISTORY OF DISEASES OF THE BLOOD AND BLOOD-FORMING ORGANS AND CERTAIN DISORDERS INVOLVING THE IMMUNE MECHANISM: ICD-10-CM

## 2022-08-16 PROCEDURE — 93000 ELECTROCARDIOGRAM COMPLETE: CPT

## 2022-08-16 PROCEDURE — 99204 OFFICE O/P NEW MOD 45 MIN: CPT

## 2022-08-16 NOTE — REASON FOR VISIT
[Symptom and Test Evaluation] : symptom and test evaluation [FreeTextEntry1] : 33 yo woman with no significant pmhx except for childhood fainting spells, who works at [employer] as a supervisor. She had a syncopal episode on 8/7/22. She was standing waiting for a cab with her fiance' and felt dizzy then +LOC for a few seconds. Last week on her way to work, while driving felt severe palpitations and dizziness but resolved on its own. Later that day she felt similar symptoms while at work and went to  ER. She also remarks on BUE numbness at times.She was stabilized with IVF and discharge to follow up with cardiology. She still has intermittent pre-syncopal episodes. She never hd any cardiac work-up. She is unsure if this is stress related since she started her position 1 year ago. No cardiac work-up in the past.\par \par Since 2020 she felt left-sided sharp "lightning pain/shock" in her head. She remarks feeling the same symptoms as a child and passing out after. Denies any seizures.\par \par

## 2022-08-16 NOTE — REVIEW OF SYSTEMS
[SOB] : shortness of breath [Palpitations] : palpitations [Dizziness] : dizziness [Under Stress] : under stress [Negative] : Heme/Lymph

## 2022-08-16 NOTE — DISCUSSION/SUMMARY
[FreeTextEntry1] : Syncope/pre-syncope/palps\par -check stress echo if able to approve and schedule.\par -7 days event monitor\par .primary care referral \par EKG section of the chart --- secondary to symptoms above an electrocardiogram also known as an EKG was performed.  Risks and benefits discussed with the patient. Patient was given time and privacy to changed into a gown. Shortly after, standard 10 leads were applied and a [employer] system was used to perform the study. The results were subsequently reviewed by attending physician and discussed with the patient. The study showed a normal sinus rhythm and no ST-T suggestive of ischemia. Order for the EKG was placed in the chart. The results were documented. Billing submitted. Emotional support provided.\par

## 2022-08-22 ENCOUNTER — RESULT REVIEW (OUTPATIENT)
Age: 32
End: 2022-08-22

## 2022-09-02 ENCOUNTER — APPOINTMENT (OUTPATIENT)
Dept: HEART AND VASCULAR | Facility: CLINIC | Age: 32
End: 2022-09-02

## 2022-09-02 VITALS
DIASTOLIC BLOOD PRESSURE: 68 MMHG | SYSTOLIC BLOOD PRESSURE: 109 MMHG | OXYGEN SATURATION: 100 % | WEIGHT: 184.19 LBS | BODY MASS INDEX: 28.57 KG/M2 | HEIGHT: 67.5 IN | TEMPERATURE: 97.7 F | HEART RATE: 81 BPM

## 2022-09-02 DIAGNOSIS — R55 SYNCOPE AND COLLAPSE: ICD-10-CM

## 2022-09-02 DIAGNOSIS — R00.2 PALPITATIONS: ICD-10-CM

## 2022-09-02 PROCEDURE — 99213 OFFICE O/P EST LOW 20 MIN: CPT

## 2022-09-02 PROCEDURE — 93351 STRESS TTE COMPLETE: CPT

## 2022-09-02 NOTE — REASON FOR VISIT
[Symptom and Test Evaluation] : symptom and test evaluation [FreeTextEntry1] : 32 year old female presents for a visit. She was evaluated secondary to palps and syncope. She completed a stress echo. We are discussing results.

## 2022-09-02 NOTE — DISCUSSION/SUMMARY
[FreeTextEntry1] : Syncope/palps Inclined towards a conservative follow up in this patient. We had a careful discussion regarding diet and exercise. Will be happy to re-evaluate.\par F/u with PMD \par RTC 6 m

## 2022-10-30 ENCOUNTER — EMERGENCY (EMERGENCY)
Facility: HOSPITAL | Age: 32
LOS: 1 days | Discharge: HOME | End: 2022-10-30
Attending: STUDENT IN AN ORGANIZED HEALTH CARE EDUCATION/TRAINING PROGRAM
Payer: COMMERCIAL

## 2022-10-30 VITALS
DIASTOLIC BLOOD PRESSURE: 84 MMHG | SYSTOLIC BLOOD PRESSURE: 124 MMHG | RESPIRATION RATE: 18 BRPM | OXYGEN SATURATION: 97 % | HEART RATE: 91 BPM | TEMPERATURE: 98 F | HEIGHT: 67 IN | WEIGHT: 171.96 LBS

## 2022-10-30 DIAGNOSIS — Z98.89 OTHER SPECIFIED POSTPROCEDURAL STATES: Chronic | ICD-10-CM

## 2022-10-30 DIAGNOSIS — Z98.890 OTHER SPECIFIED POSTPROCEDURAL STATES: Chronic | ICD-10-CM

## 2022-10-30 LAB — SARS-COV-2 RNA SPEC QL NAA+PROBE: DETECTED

## 2022-10-30 PROCEDURE — 99283 EMERGENCY DEPT VISIT LOW MDM: CPT

## 2022-10-30 PROCEDURE — 87635 SARS-COV-2 COVID-19 AMP PRB: CPT

## 2022-10-30 RX ORDER — IPRATROPIUM BROMIDE 21 MCG
2 AEROSOL, SPRAY (ML) NASAL
Qty: 1 | Refills: 0
Start: 2022-10-30 | End: 2022-11-02

## 2022-10-30 RX ORDER — DEXTROMETHORPHAN HYDROBROMIDE AND PROMETHAZINE HYDROCHLORIDE 15; 6.25 MG/5ML; MG/5ML
5 SYRUP ORAL
Qty: 1 | Refills: 0
Start: 2022-10-30 | End: 2022-11-02

## 2022-10-30 NOTE — ED PROVIDER NOTE - OBJECTIVE STATEMENT
34 year old female 32 year old female with a history of fibroids presents with reports of nasal congestion, cough, sore throat and ear pain since Thursday. Reports her son has similar symptoms. Took a home covid test with showed a faint positive. Has been taking nyquil and ran shots for her symptoms with relief. Denies fever, shortness of breath, chest pain, abdominal pain, urinary symptoms.

## 2022-10-30 NOTE — ED PROVIDER NOTE - NSFOLLOWUPINSTRUCTIONS_ED_ALL_ED_FT
Your covid test results will be texted to you in 24-48 hours.    If you test positive for covid you must quarantine for 5 days from start of symptoms or from swab date if asymptomatic.     Take motrin and/or tylenol as needed for fever or pain     Nasal spray was sent to the pharmacy for you. This will help with the post nasal drip and sore throat.    Take over the counter cold medications such as DayQuil and NyQuil to treat other cold symptoms.    For sore throat you can use Cepacol Lozenges.     If you experience any new or worsening symptoms such as chest pain or difficulty breathing or if you are concerned you can always come back to the emergency for a re-evaluation.

## 2022-10-30 NOTE — ED PROVIDER NOTE - CARE PLAN
Principal Discharge DX:	Viral illness  Secondary Diagnosis:	Encounter for screening for COVID-19   1

## 2022-10-30 NOTE — ED PROVIDER NOTE - ENMT, MLM
Airway patent, Nasal mucosa clear. Mouth with normal mucosa. Throat has no vesicles, no oropharyngeal exudates and uvula is midline. +post nasal drip

## 2022-10-30 NOTE — ED PROVIDER NOTE - PATIENT PORTAL LINK FT
You can access the FollowMyHealth Patient Portal offered by [facility] by registering at the following website: [email or web address]. By joining [facility]’s FollowMyHealth portal, you will also be able to view your health information using other applications (apps) compatible with our system.

## 2022-10-30 NOTE — ED PROVIDER NOTE - CLINICAL SUMMARY MEDICAL DECISION MAKING FREE TEXT BOX
32 year old female with cold symptoms and faint positive home covid test. Well appearing on exam. Will repeat covid test.

## 2022-11-16 ENCOUNTER — EMERGENCY (EMERGENCY)
Facility: HOSPITAL | Age: 32
LOS: 1 days | Discharge: HOME | End: 2022-11-16
Attending: STUDENT IN AN ORGANIZED HEALTH CARE EDUCATION/TRAINING PROGRAM
Payer: COMMERCIAL

## 2022-11-16 VITALS
SYSTOLIC BLOOD PRESSURE: 115 MMHG | OXYGEN SATURATION: 99 % | HEIGHT: 67 IN | TEMPERATURE: 99 F | HEART RATE: 90 BPM | RESPIRATION RATE: 17 BRPM | DIASTOLIC BLOOD PRESSURE: 66 MMHG

## 2022-11-16 DIAGNOSIS — Z98.890 OTHER SPECIFIED POSTPROCEDURAL STATES: Chronic | ICD-10-CM

## 2022-11-16 DIAGNOSIS — Z98.89 OTHER SPECIFIED POSTPROCEDURAL STATES: Chronic | ICD-10-CM

## 2022-11-16 LAB
RAPID RVP RESULT: SIGNIFICANT CHANGE UP
SARS-COV-2 RNA SPEC QL NAA+PROBE: SIGNIFICANT CHANGE UP

## 2022-11-16 PROCEDURE — 99283 EMERGENCY DEPT VISIT LOW MDM: CPT

## 2022-11-16 PROCEDURE — 0225U NFCT DS DNA&RNA 21 SARSCOV2: CPT

## 2022-11-16 NOTE — ED PROVIDER NOTE - NSFOLLOWUPINSTRUCTIONS_ED_ALL_ED_FT
normal (ped)... Your covid test results will be texted to you within 24 hours.    If you test positive for covid you must quarantine for 5 days from start of symptoms or from swab date if asymptomatic.     Take motrin and/or tylenol as needed for fever or pain     Medications sent to the pharmacy for you. Take as directed. Sometimes the cough medicine is not covered by insurance, if that is the case you can either pay out of pocket for it or take over the counter cold medications such as DayQuil or NyQuil severe to treat it.    For sore throat you can use Cepacol Lozenges.     If you experience any new or worsening symptoms such as chest pain or difficulty breathing or if you are concerned you can always come back to the emergency for a re-evaluation.

## 2022-11-16 NOTE — ED PROVIDER NOTE - CPE EDP CARDIAC NORM
normal...
Detail Level: Detailed
Quality 226: Preventive Care And Screening: Tobacco Use: Screening And Cessation Intervention: Patient screened for tobacco use and is an ex/non-smoker
Quality 431: Preventive Care And Screening: Unhealthy Alcohol Use - Screening: Patient identified as an unhealthy alcohol user when screened for unhealthy alcohol use using a systematic screening method and received brief counseling
Quality 130: Documentation Of Current Medications In The Medical Record: Current Medications Documented

## 2022-11-16 NOTE — ED PROVIDER NOTE - OBJECTIVE STATEMENT
32 year old female with no significant past medical history presents to the ED with complaints of sore throat, runny nose, and lost voice today. The patient states that she drank ran tea for her symptoms. The patient denies any other symptoms including chest pain, shortness of breath, and fever. NKDA.

## 2022-11-16 NOTE — ED PROVIDER NOTE - PATIENT PORTAL LINK FT
You can access the FollowMyHealth Patient Portal offered by [facility] by registering at the following website: [email or web address]. By joining [facility]’s FollowMyHealth portal, you will also be able to view your health information using other applications (apps) compatible with our system.

## 2022-11-16 NOTE — ED PROVIDER NOTE - NSICDXPASTMEDICALHX_GEN_ALL_CORE_FT
PAST MEDICAL HISTORY:  Fibroids uterine    History of termination of pregnancy x2    Ovarian cyst

## 2022-11-16 NOTE — ED PROVIDER NOTE - CLINICAL SUMMARY MEDICAL DECISION MAKING FREE TEXT BOX
32 year old female with no significant past medical history presents to the ED with viral symptoms. Well appearing on exa. Will obtain RVP.

## 2022-12-09 NOTE — ED ADULT NURSE NOTE - ISOLATION TYPE:
Physician Discharge Summary     Patient ID:  Efra White  [id number]  24 y.o.  1971    Admit date: 12/8/2022    Discharge date and time: 12/9/2022 12:54 PM     Admitting Physician: Bárbara Crow MD     Discharge Physician: Melonie Larios MD    Admission Diagnoses: Osteoarthritis of right knee [M17.11]  Status post total knee replacement, right [Z96.651]    Discharge Diagnoses: Osteoarthritis of right knee [M17.11]  Status post total knee replacement, right [Z96.651]    Admission Condition: good    Discharged Condition: good    Surgery: Right Marcelo robotic assisted total knee arthroplasty    Hospital Course: The patient was admitted for elective joint replacement. The patient underwent routine right Marcelo robotic assisted total knee arthroplasty with anesthesia and was transferred to the orthopaedic floor from PACU following surgery. The post operative hospital course was unremarkable. The patient worked with PT/OT daily to improve mobility. Pain was controlled and DVT prophylaxis was with SCD's and ASA 81 BID. The patient was discharged on POD 1 to home. Consults: PCP, PT/OT, Case management     Significant Diagnostic Studies:   Post operative xray showed components in good position     Treatments:   IV hydration  antibiotics: Ancef perioperatively for 24 hours  analgesia: oral and IV narcotics; toradol  anticoagulation: ASA 81 BID  therapies: PT, OT and    surgery: as above     Discharge Exam:  Operative limb incision was clean, dry, and intact. Leg swelling was minimal.  Motor and sensory were intact throughout the operative leg. Disposition: home    Patient Instructions:   Discharge Medication List as of 12/9/2022 12:10 PM        START taking these medications    Details   oxyCODONE-acetaminophen (PERCOCET) 5-325 MG per tablet Take 1 tablet by mouth every 6 hours as needed for Pain for up to 7 days. Intended supply: 7 days.  Take lowest dose possible to manage pain, Disp-28 tablet, R-0Normal      aspirin EC 81 MG EC tablet Take 1 tablet by mouth 2 times daily for 28 days, Disp-56 tablet, R-0Normal           CONTINUE these medications which have NOT CHANGED    Details   rosuvastatin (CRESTOR) 20 MG tablet take 1 tablet by mouth once daily at bedtime, Disp-90 tablet, R-1Normal      irbesartan (AVAPRO) 300 MG tablet take 1 tablet by mouth once daily, Disp-90 tablet, R-1Normal      acetaminophen (TYLENOL) 500 MG tablet Take 500 mg by mouth every 6 hours as needed for Pain Instructed to take morning of surgery with a sip of water if neededHistorical Med      hydroCHLOROthiazide (MICROZIDE) 12.5 MG capsule Take 1 capsule by mouth every morning, Disp-90 capsule, R-1Normal      buPROPion (WELLBUTRIN XL) 150 MG extended release tablet Take 2 tablets by mouth every morning, Disp-180 tablet, R-1Normal      escitalopram (LEXAPRO) 10 MG tablet Take 1 tablet by mouth daily, Disp-90 tablet, R-1Normal           Activity: no driving while on analgesics; weight bearing as tolerated. Diet: regular diet  Wound Care: as directed    Follow-up with  in 1 week.   Call [phone] for appointment     Signed:  SHYAM Anne   Orthopaedic Surgery   12/9/22  1:25 PM None

## 2022-12-12 NOTE — ED ADULT NURSE NOTE - NS ED PATIENT SAFETY CONCERN
Pt notified of below.  Pt reports understanding.  All questions answered.  Future lab order placed by Dr. Beatty.  Reviewed testing guidelines and expectations.  Pt conveys understanding.  Pt scheduled appointment.    Xiao Kendall   [facility]  [facility]     No

## 2023-01-30 NOTE — BRIEF OPERATIVE NOTE - ANTIBIOTIC PROTOCOL
Do you know if the office has anything on Advance Care Planning we could send to her? I will also discuss with her at her next appt.  Followed protocol

## 2023-03-06 ENCOUNTER — APPOINTMENT (OUTPATIENT)
Dept: HEART AND VASCULAR | Facility: CLINIC | Age: 33
End: 2023-03-06

## 2023-04-18 NOTE — OB RN PATIENT PROFILE - PT NEEDS ASSIST
no Minoxidil Pregnancy And Lactation Text: This medication has not been assigned a Pregnancy Risk Category but animal studies failed to show danger with the topical medication. It is unknown if the medication is excreted in breast milk.

## 2023-05-08 ENCOUNTER — OUTPATIENT (OUTPATIENT)
Dept: OUTPATIENT SERVICES | Facility: HOSPITAL | Age: 33
LOS: 1 days | End: 2023-05-08
Payer: COMMERCIAL

## 2023-05-08 DIAGNOSIS — B34.9 VIRAL INFECTION, UNSPECIFIED: ICD-10-CM

## 2023-05-08 DIAGNOSIS — Z98.89 OTHER SPECIFIED POSTPROCEDURAL STATES: Chronic | ICD-10-CM

## 2023-05-08 DIAGNOSIS — Z98.890 OTHER SPECIFIED POSTPROCEDURAL STATES: Chronic | ICD-10-CM

## 2023-05-08 LAB
RAPID RVP RESULT: DETECTED
RV+EV RNA SPEC QL NAA+PROBE: DETECTED
SARS-COV-2 RNA SPEC QL NAA+PROBE: SIGNIFICANT CHANGE UP

## 2023-05-08 PROCEDURE — 0225U NFCT DS DNA&RNA 21 SARSCOV2: CPT

## 2023-08-19 NOTE — ED PROVIDER NOTE - HISTORY ATTESTATION, MLM
I have reviewed and confirmed nurses' notes... Tumor Depth: Less than 6mm from granular layer and no invasion beyond the subcutaneous fat

## 2023-09-13 ENCOUNTER — NON-APPOINTMENT (OUTPATIENT)
Age: 33
End: 2023-09-13

## 2023-10-05 NOTE — ED PROVIDER NOTE - CARDIAC, MLM
Refill Routing Note   Medication(s) are not appropriate for processing by [facility] for the following reason(s):      No active prescription written by provider    [facility] action(s):  Route Care Due:  None identified            Appointments  past 12m or future 3m with PCP    Date Provider   Last Visit   9/7/2023 Madhavi Mercado MD   Next Visit   Visit date not found Madhavi Mercado MD   ED visits in past 90 days: 0        Note composed:4:11 PM 10/05/2023           Normal rate, regular rhythm.  Heart sounds S1, S2.  No murmurs, rubs or gallops.

## 2023-11-07 NOTE — PROGRESS NOTE ADULT - PROBLEM SELECTOR PLAN 1
Please see the attached refill request.  
Plan for D&C and hysteroscopy today as add-on case  Dr. Ford at bedside with signed consent  check cbc at 10 am (2hrs post transfusion)  Continue monitoring vital signs
-Follow up repeat CBC @12pm to ensure hgb stable  -Continue close monitoring  - Monitor vitals  - Possible dc home this PM.   - D/w Dr. Morris, house attending

## 2024-05-28 ENCOUNTER — EMERGENCY (EMERGENCY)
Facility: HOSPITAL | Age: 34
LOS: 0 days | Discharge: HOME | End: 2024-05-29
Attending: EMERGENCY MEDICINE
Payer: COMMERCIAL

## 2024-05-28 VITALS
DIASTOLIC BLOOD PRESSURE: 81 MMHG | SYSTOLIC BLOOD PRESSURE: 122 MMHG | OXYGEN SATURATION: 100 % | TEMPERATURE: 98 F | HEART RATE: 87 BPM | RESPIRATION RATE: 16 BRPM

## 2024-05-28 DIAGNOSIS — M54.9 DORSALGIA, UNSPECIFIED: ICD-10-CM
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
## 2024-05-28 DIAGNOSIS — Z98.890 OTHER SPECIFIED POSTPROCEDURAL STATES: Chronic | ICD-10-CM
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
## 2024-05-28 DIAGNOSIS — M25.512 PAIN IN LEFT SHOULDER: ICD-10-CM

## 2024-05-28 DIAGNOSIS — Z98.89 OTHER SPECIFIED POSTPROCEDURAL STATES: Chronic | ICD-10-CM

## 2024-05-28 PROCEDURE — 71101 X-RAY EXAM UNILAT RIBS/CHEST: CPT | Mod: LT

## 2024-05-28 PROCEDURE — 99053 MED SERV 10PM-8AM 24 HR FAC: CPT

## 2024-05-28 PROCEDURE — 99284 EMERGENCY DEPT VISIT MOD MDM: CPT

## 2024-05-28 PROCEDURE — 99284 EMERGENCY DEPT VISIT MOD MDM: CPT | Mod: 25

## 2024-05-28 PROCEDURE — 73030 X-RAY EXAM OF SHOULDER: CPT | Mod: LT

## 2024-05-28 RX ORDER — IBUPROFEN 200 MG
600 TABLET ORAL ONCE
Refills: 0 | Status: COMPLETED | OUTPATIENT
Start: 2024-05-28 | End: 2024-05-28

## 2024-05-28 RX ORDER — ACETAMINOPHEN 500 MG
650 TABLET ORAL ONCE
Refills: 0 | Status: COMPLETED | OUTPATIENT
Start: 2024-05-28 | End: 2024-05-28

## 2024-05-28 RX ADMIN — Medication 600 MILLIGRAM(S): at 23:46

## 2024-05-28 RX ADMIN — Medication 650 MILLIGRAM(S): at 23:46
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
## 2024-05-29 PROCEDURE — 73030 X-RAY EXAM OF SHOULDER: CPT | Mod: 26,LT

## 2024-05-29 PROCEDURE — 71101 X-RAY EXAM UNILAT RIBS/CHEST: CPT | Mod: 26,LT
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
## 2024-05-29 NOTE — ED PROVIDER NOTE - PHYSICAL EXAMINATION
CONST: Well appearing in NAD  EYES: PERRL, EOMI, Sclera and conjunctiva clear.   ENT: Oropharynx normal appearing, no erythema or exudates. Uvula midline.  CARD: Normal S1 S2; Normal rate and rhythm  RESP: Equal BS B/L, No wheezes, rhonchi or rales. No distress  GI: Soft, non-tender, non-distended.  MS: L flank chest wall TTP. L lateral shoulder pain.   SKIN: Warm, dry, no acute rashes.   NEURO: A&Ox3, No focal deficits. Strength 5/5 with no sensory deficits. Steady gait

## 2024-05-29 NOTE — ED ADULT NURSE NOTE - HIV OFFER
[facility] INPATIENT ENCOUNTER  GI CONSULT NOTE    ADMISSION DATE:  11/18/2019  CONSULTING PHYSICIAN:  Slade Nash MD  ATTENDING PHYSICIAN:  No att. providers found   CODE STATUS:  Prior      CHIEF COMPLAINT:  Abdominal pain    HPI/INTERVAL HISTORY:  I was asked to see Yvette Tolbert at the request of Maia England PA-C for Gastroenterology Consultation.   Yvette Tolbert is a pleasant 86 year old female patient with a history of hypertension, CHF, GERD, and ILD on 2 L home O2 who presents today with abdominal pain. Started in the last 2-3 weeks. Located in her lower abdomen. Feels like a constant pressure. Daughter states that Tums and gingerale have helped intermittently. Patient states discomfort is worse if she needs to use the bathroom. Once she is able to evacuate either urine or stool, she feels a little bit of relief. Denies painful urination or defecation. No blood in the urine. No blood in the stool, slightly darker, but has been since she started iron. Typically has a formed BM every other day. No straining or hard stools. Not using loperamide. Has nausea, but no vomiting. Appetite okay.  No significant weight changes. She is taking pantoprazole and Carafate. Sometimes does feel like food or drink feel stuck in her throat, but it is not painful to swallow. Also on diclofenac daily for joint pain, otherwise does not use Advil or aleve. Is a on ASA and Plavix.    She did have EGD by Dr. Nash in 12/2018 with findings of HH and alysha erosions. Per patient, has had previous colonoscopies with polyps but unsure of when last was done.    On evaluation, vitals were stable. Labs noted Hgb 5.0 and troponin slightly elevated. Stool noted to be heme positive. CT abdomen pending.    REVIEW OF SYSTEMS:     Denies: fevers, chills, weight changes, vomiting, odynophagia, melena, blood in stool, anorexia, constipation, diarrhea   All other systems reviewed and are negative with the except of the  findings noted in  the history of present illness.    MEDICATIONS:    Medications were reviewed.    ALLERGIES:  Allergies were reviewed.    HISTORIES:    PAST MEDICAL HISTORY:    Varicose vein                                                 Colon polyp                                                   Acid reflux                                                   OA (osteoarthritis)                                           Hypercholesterolemia                                          Migraine                                                      Eczema                                                        Pneumonia                                                     Congestive cardiac failure (CMS/HCC)                          Essential (primary) hypertension                              Osteoporosis                                                  Anemia                                                        PAST SURGICAL HISTORY:    VARICOSE VEIN SURGERY                           1956            Comment: left leg    HYSTERECTOMY                                                  KNEE SCOPE,DIAGNOSTIC                           2006            Comment: Dr. Barahona    TOTAL KNEE REPLACEMENT                          11/26/2012      Comment: Right TKR    Dr. Burgess    JOINT REPLACEMENT                                             EYE SURGERY                                                     Comment: r eye cataract implant    ESOPHAGOGASTRODUODENOSCOPY TRANSORAL FLEX W/BX* 12/20/2018      Comment: Large Hiatal Hernia with Ministerio Erosions - Dr Nash    FAMILY MEDICAL HISTORY:  Negative GI history    SOCIAL HISTORY:   Patient is . She lives alone in a house. No longer drives. She has 6 children. She does not smoke or drink alcohol.      OBJECTIVE:    PROBLEM LIST:    Patient Active Problem List   Diagnosis   • Osteoarthrosis, unspecified whether generalized or localized, lower leg   • Unspecified disorder of muscle,  ligament, and fascia   • Pain in joint, lower leg   • Prolapse of vaginal vault after hysterectomy   • Status Post Right TKR   • CAP (community acquired pneumonia)   • ILD (interstitial lung disease) (CMS/HCC)       VITAL SIGNS:    Vital Last Value 24 Hour Range   Temperature 97.4 °F (36.3 °C) (11/18/19 1300) Temp  Min: 97.4 °F (36.3 °C)  Max: 97.4 °F (36.3 °C)   Pulse 82 (11/18/19 1439) Pulse  Min: 82  Max: 89   Respiratory 21 (11/18/19 1439) Resp  Min: 17  Max: 23   Non-Invasive  Blood Pressure 130/61 (11/18/19 1439) BP  Min: 97/50  Max: 130/61   Pulse Oximetry 100 % (11/18/19 1439) SpO2  Min: 92 %  Max: 100 %     Vital Today Admitted   Weight       Height N/A     BMI N/A       INTAKE/OUTPUT:    No intake or output data in the 24 hours ending 11/18/19 1529     PHYSICAL EXAM:  General: The patient is alert, cooperative, in no acute distress, appears stated age.   Neurologic: Oriented x3. Normal mood and affect. Normal speech, able to move all extremities.  Head: Normocephalic.  Eyes: Sclera non-icteric. No conjunctival pallor.  Throat: Oropharynx clear.  Neck: Symmetric without swelling or tenderness, trachea midline. No thyromegaly. No cervical or supraclavicular lymphadenopathy.  Respiratory: All lung fields clear to auscultation.  Cardiovascular: Regular rate and rhythm.  Gastrointestinal:  Soft, rounded, nontender, nondistended. Normal bowel sounds.  No hepatomegaly or splenomegaly. No masses. No rebound or guarding.  Rectal: Deferred.    LABORATORY DATA:    Component      Latest Ref Rng & Units 11/18/2019   WBC      4.2 - 11.0 K/mcL 9.3   RBC      4.00 - 5.20 mil/mcL 1.70 (L)   HGB      12.0 - 15.5 g/dL 5.0 (LL)   HCT      36.0 - 46.5 % 17.0 (L)   MCV      78.0 - 100.0 fl 100.0   MCH      26.0 - 34.0 pg 29.4   MCHC      32.0 - 36.5 g/dL 29.4 (L)   RDW-CV      11.0 - 15.0 % 14.7   PLT      140 - 450 K/mcL 393   NRBC      0 /100 WBC 0   DIFFERENTIAL TYPE       AUTOMATED DIFFERENTIAL   Neutrophil      % 80    LYMPH      % 10   MONO      % 9   EOSIN      % 1   BASO      % 0   Percent Immature Granuloctyes      % 0   Absolute Neutrophil      1.8 - 7.7 K/mcL 7.4   Absolute Lymph      1.0 - 4.0 K/mcL 1.0   Absolute Mono      0.3 - 0.9 K/mcL 0.8   Absolute Eos      0.1 - 0.5 K/mcL 0.1   Absolute Baso      0.0 - 0.3 K/mcL 0.0   Absolute Immature Granulocytes      0 - 0.2 K/mcl 0.0   Sodium      135 - 145 mmol/L 139   Potassium      3.4 - 5.1 mmol/L 5.1   Chloride      98 - 107 mmol/L 105   CO2      21 - 32 mmol/L 25   ANION GAP      10 - 20 mmol/L 14   Glucose      65 - 99 mg/dL 118 (H)   BUN      6 - 20 mg/dL 66 (H)   Creatinine      0.51 - 0.95 mg/dL 2.12 (H)   GFR Estimate,        23   GFR Estimate, Non African American       20   BUN/CREATININE RATIO      7 - 25 31 (H)   CALCIUM      8.4 - 10.2 mg/dL 8.7   TOTAL BILIRUBIN      0.2 - 1.0 mg/dL 0.5   AST/SGOT      <38 Units/L 16   ALT/SGPT      <64 Units/L 27   ALK PHOSPHATASE      45 - 117 Units/L 74   TOTAL PROTEIN      6.4 - 8.2 g/dL 6.6   Albumin      3.6 - 5.1 g/dL 3.2 (L)   GLOBULIN      2.0 - 4.0 g/dL 3.4   A/G Ratio, Serum      1.0 - 2.4 0.9 (L)     IMAGING STUDIES:    CT chest abdomen pelvis 12/20/2018  IMPRESSION:  1.  No acute findings.  2.  Posterior focal fibrosis with bronchiectasis in the lungs, raising  concern for interstitial lung disease. Follow-up high-resolution chest CT  is recommended for further evaluation.  3.  Moderate hiatal hernia.  4.  Severe coronary calcification.    ASSESSMENT AND PLAN:  Yvette Tolbert is a 86 year old female patient with a history as outlined above presenting with 2-3 week history of abdominal pain. Otherwise appears to be stable. Noted to have marked anemia and elevated troponin on admission. Unclear etiology of pain, describes low discomfort relieved by urinating, defecating, and possibly antiacids. In regards to anemia and heme positive stool, however, suspect upper GI source. Await urine culture to  rule out UTI and abdominal CT. Due to anemia, will tentatively plan on EGD under MAC sedation for further evaluation tomorrow - rule out peptic pathology such as ulcer or alysha erosions. In the meantime, continue to monitor H&H. Transfuse as needed. Continue PPI drip and other supportive measures. Further recommendations based on test findings and clinical course. GI to follow.    A copy of this note was sent to the referring provider. Thank you for involving me in the care of this patient.    Discussed with Slade Nash MD.    Lluvia Osborn PA-C  11/18/19 @ 3:29 PM      ADDENDUM:  Pt seen yesterday in ED; daughter at bedside.  Patient discussed in detail with PA.  PE:  ANICTERIC  CHEST-CLEAR  [employer]-RRR  ABD +BS. NONTENDER  Plan as outlined above.  Suspect PUD related to diclofenac.  PPI drip.  Monitor H/H.  EGD for further evaluation and treatment.  Multiple questions answered.  Patient and her daughter are agreeable with above evaluation and plan.               Opt out

## 2024-05-29 NOTE — ED PROVIDER NOTE - PATIENT PORTAL LINK FT
You can access the FollowMyHealth Patient Portal offered by [facility] by registering at the following website: [email or web address]. By joining [facility] [employer]’s FollowMyHealth portal, you will also be able to view your health information using other applications (apps) compatible with our system.
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
## 2024-06-06 NOTE — ED PROVIDER NOTE - CROS ED EYES ALL NEG
Detail Level: Simple Render Post-Care Instructions In Note?: no Show Applicator Variable?: Yes Post-Care Instructions: I reviewed with the patient in detail post-care instructions. Patient is to wear sunprotection, and avoid picking at any of the treated lesions. Pt may apply Vaseline to crusted or scabbing areas. Duration Of Freeze Thaw-Cycle (Seconds): 0 Consent: The patient's consent was obtained including but not limited to risks of crusting, scabbing, blistering, scarring, darker or lighter pigmentary change, recurrence, incomplete removal and infection. negative...

## 2024-10-03 NOTE — ED ADULT NURSE NOTE - NSIMPLEMENTINTERV_GEN_ALL_ED
Lazarus, Max(Attending) Implemented All Fall Risk Interventions:  [place] to call system. Call bell, personal items and telephone within reach. Instruct patient to call for assistance. Room bathroom lighting operational. Non-slip footwear when patient is off stretcher. Physically safe environment: no spills, clutter or unnecessary equipment. Stretcher in lowest position, wheels locked, appropriate side rails in place. Provide visual cue, wrist band, yellow gown, etc. Monitor gait and stability. Monitor for mental status changes and reorient to person, place, and time. Review medications for side effects contributing to fall risk. Reinforce activity limits and safety measures with patient and family.
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
## 2025-07-24 NOTE — ED PROVIDER NOTE - CPE EDP GASTRO NORM
Has The Growth Been Previously Biopsied?: has not been previously biopsied Body Location Override (Optional): Right shin normal...

## 2025-07-31 NOTE — OB PROVIDER TRIAGE NOTE - LABOR: CERVICAL CONSISTENCY
Preventive Care Visit  [facility]  Asif Qureshi NP, Nurse Practitioner Primary Care  Jul 31, 2025      Assessment & Plan     Encounter for annual physical exam  Discussed option of labs another day with glucose/cholesterol. Will defer for future year (mutual decision making)    Plantar warts  Applied cryotherapy x 5 to verrucous lesions on the 1st digit, which patient tolerated well. Discussed applying Compound W or salicylic acid preparation to lesions.    Patient has been advised of split billing requirements and indicates understanding: Yes    Counseling  Appropriate preventive services were addressed with this patient via screening, questionnaire, or discussion as appropriate for fall prevention, nutrition, physical activity, Tobacco-use cessation, social engagement, weight loss and cognition.  Checklist reviewing preventive services available has been given to the patient.  Reviewed patient's diet, addressing concerns and/or questions.   The patient was instructed to see the dentist every 6 months.   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   Single. No concern for STDs.    Diet: yogurt, banana, apple, take out. 1 serving vegetable.  Exercise: Bicycle. Back and forth to work. To gas.  Sleep: pretty good. Back into rhythm.        7/31/2025     3:30 PM   Additional Questions   Roomed by benito   Accompanied by self          HPI    Advance Care Planning    Discussed advance care planning with patient; informed AVS has link to [employer].        7/31/2025   General Health   How would you rate your overall physical health? Good   Feel stress (tense, anxious, or unable to sleep) Only a little   (!) STRESS CONCERN      7/31/2025   Nutrition  "  Three or more servings of calcium each day? Yes   Diet: Regular (no restrictions)   How many servings of fruit and vegetables per day? (!) 2-3   How many sweetened beverages each day? (!) 2         7/31/2025   Exercise   Days per week of moderate/strenous exercise 5 days   Average minutes spent exercising at this level 20 min         7/31/2025   Social Factors   Frequency of gathering with friends or relatives Once a week   Worry food won't last until get money to buy more No   Food not last or not have enough money for food? No   Do you have housing? (Housing is defined as stable permanent housing and does not include staying outside in a car, in a tent, in an abandoned building, in an overnight shelter, or couch-surfing.) Yes   Are you worried about losing your housing? No   Lack of transportation? No   Unable to get utilities (heat,electricity)? No         7/31/2025   Dental   Dentist two times every year? (!) NO               7/31/2025   Substance Use   Alcohol more than 3/day or more than 7/wk No   Do you use any other substances recreationally? (!) CANNABIS PRODUCTS     Social History     Tobacco Use    Smoking status: Never     Passive exposure: Never    Smokeless tobacco: Never   Vaping Use    Vaping status: Never Used   Substance Use Topics    Alcohol use: No    Drug use: Yes     Types: Marijuana           7/31/2025   STI Screening   New sexual partner(s) since last STI/HIV test? No         7/31/2025   Contraception/Family Planning   Questions about contraception or family planning No        Reviewed and updated as needed this visit by Provider                             Objective    Exam  /68   Pulse 88   Temp 98.1  F (36.7  C) (Temporal)   Resp 14   Ht 1.85 m (6' 0.84\")   Wt 78.9 kg (174 lb)   SpO2 98%   BMI 23.06 kg/m     Estimated body mass index is 23.06 kg/m  as calculated from the following:    Height as of this encounter: 1.85 m (6' 0.84\").    Weight as of this encounter: 78.9 kg " (174 lb).    Physical Exam  GENERAL: alert and no distress  EYES: Eyes grossly normal to inspection, PERRL and conjunctivae and sclerae normal  HENT: ear canals and TM's normal, nose and mouth without ulcers or lesions  NECK: no adenopathy, no asymmetry, masses, or scars  RESP: lungs clear to auscultation - no rales, rhonchi or wheezes  CV: regular rate and rhythm, normal S1 S2, no S3 or S4, no murmur, click or rub, no peripheral edema  ABDOMEN: soft, nontender, no hepatosplenomegaly, no masses and bowel sounds normal  MS: no gross musculoskeletal defects noted, no edema  SKIN: Verrucous lesion noted on right elbow. 4 plantar warts noted on the plantar surface. 1 plantar wart noted on the lateral aspect of 1st digit. Verrucous lesions noted on the distal aspect of 1st digits.  NEURO: Normal strength and tone, mentation intact and speech normal  PSYCH: mentation appears normal, affect normal/bright  : Exam declined by parent/patient. Reason for decline: Patient/Parental preference      Vision Screen       Hearing Screen           Signed Electronically by: Asif Qureshi NP     medium